# Patient Record
Sex: MALE | Race: WHITE | Employment: OTHER | URBAN - METROPOLITAN AREA
[De-identification: names, ages, dates, MRNs, and addresses within clinical notes are randomized per-mention and may not be internally consistent; named-entity substitution may affect disease eponyms.]

---

## 2024-02-12 LAB — NON-UH HIE PLATELET FUNCTION (ASPIRIN): 621 ARU

## 2024-02-15 LAB — Lab: 207 PRU

## 2025-02-26 ENCOUNTER — HOSPITAL ENCOUNTER (OUTPATIENT)
Dept: RADIOLOGY | Facility: CLINIC | Age: 65
Discharge: HOME | End: 2025-02-26
Payer: MEDICARE

## 2025-02-26 ENCOUNTER — OFFICE VISIT (OUTPATIENT)
Dept: ORTHOPEDIC SURGERY | Facility: CLINIC | Age: 65
End: 2025-02-26
Payer: MEDICARE

## 2025-02-26 DIAGNOSIS — M25.551 RIGHT HIP PAIN: ICD-10-CM

## 2025-02-26 DIAGNOSIS — S73.191A TEAR OF RIGHT ACETABULAR LABRUM, INITIAL ENCOUNTER: Primary | ICD-10-CM

## 2025-02-26 PROCEDURE — 1036F TOBACCO NON-USER: CPT | Performed by: STUDENT IN AN ORGANIZED HEALTH CARE EDUCATION/TRAINING PROGRAM

## 2025-02-26 PROCEDURE — 99213 OFFICE O/P EST LOW 20 MIN: CPT | Performed by: STUDENT IN AN ORGANIZED HEALTH CARE EDUCATION/TRAINING PROGRAM

## 2025-02-26 PROCEDURE — 99203 OFFICE O/P NEW LOW 30 MIN: CPT | Performed by: STUDENT IN AN ORGANIZED HEALTH CARE EDUCATION/TRAINING PROGRAM

## 2025-02-26 PROCEDURE — 1159F MED LIST DOCD IN RCRD: CPT | Performed by: STUDENT IN AN ORGANIZED HEALTH CARE EDUCATION/TRAINING PROGRAM

## 2025-02-26 PROCEDURE — 99214 OFFICE O/P EST MOD 30 MIN: CPT | Performed by: STUDENT IN AN ORGANIZED HEALTH CARE EDUCATION/TRAINING PROGRAM

## 2025-02-26 PROCEDURE — 73502 X-RAY EXAM HIP UNI 2-3 VIEWS: CPT | Mod: RT

## 2025-02-26 RX ORDER — TAMSULOSIN HYDROCHLORIDE 0.4 MG/1
1 CAPSULE ORAL
COMMUNITY
Start: 2025-02-03

## 2025-02-26 RX ORDER — TIZANIDINE HYDROCHLORIDE 2 MG/1
2 CAPSULE, GELATIN COATED ORAL 3 TIMES DAILY
COMMUNITY
Start: 2023-12-07

## 2025-02-26 RX ORDER — CETIRIZINE HYDROCHLORIDE 10 MG/1
1 TABLET ORAL DAILY
COMMUNITY

## 2025-02-26 RX ORDER — NITROGLYCERIN 0.4 MG/1
0.4 TABLET SUBLINGUAL EVERY 5 MIN PRN
COMMUNITY
Start: 2023-10-12

## 2025-02-26 RX ORDER — HYDROCHLOROTHIAZIDE 12.5 MG/1
12.5 CAPSULE ORAL EVERY MORNING
COMMUNITY

## 2025-02-26 RX ORDER — TRAMADOL HYDROCHLORIDE 50 MG/1
50 TABLET ORAL EVERY 8 HOURS
COMMUNITY
Start: 2024-03-06

## 2025-02-26 RX ORDER — GABAPENTIN 300 MG/1
1 CAPSULE ORAL 3 TIMES DAILY
COMMUNITY
Start: 2023-12-05

## 2025-02-26 RX ORDER — PANTOPRAZOLE SODIUM 40 MG/1
40 TABLET, DELAYED RELEASE ORAL
COMMUNITY
Start: 2022-02-10

## 2025-02-26 RX ORDER — LOSARTAN POTASSIUM 100 MG/1
100 TABLET ORAL DAILY
COMMUNITY

## 2025-02-26 RX ORDER — METOPROLOL SUCCINATE 50 MG/1
50 TABLET, EXTENDED RELEASE ORAL DAILY
COMMUNITY

## 2025-02-26 RX ORDER — SEMAGLUTIDE 2.68 MG/ML
INJECTION, SOLUTION SUBCUTANEOUS WEEKLY
COMMUNITY

## 2025-02-26 RX ORDER — OMEPRAZOLE 40 MG/1
40 CAPSULE, DELAYED RELEASE ORAL
COMMUNITY

## 2025-02-26 RX ORDER — AMLODIPINE BESYLATE 2.5 MG/1
1 TABLET ORAL DAILY
COMMUNITY
Start: 2024-11-12

## 2025-02-26 RX ORDER — ATORVASTATIN CALCIUM 40 MG/1
40 TABLET, FILM COATED ORAL DAILY
COMMUNITY
Start: 2022-02-10

## 2025-02-26 RX ORDER — LOSARTAN POTASSIUM AND HYDROCHLOROTHIAZIDE 25; 100 MG/1; MG/1
1 TABLET ORAL DAILY
COMMUNITY
Start: 2022-05-25

## 2025-02-26 RX ORDER — METFORMIN HYDROCHLORIDE 500 MG/1
500 TABLET, EXTENDED RELEASE ORAL
COMMUNITY
Start: 2022-04-07

## 2025-02-26 RX ORDER — ASPIRIN 81 MG/1
1 TABLET ORAL DAILY
COMMUNITY

## 2025-02-26 RX ORDER — CLOPIDOGREL BISULFATE 75 MG/1
75 TABLET ORAL DAILY
COMMUNITY

## 2025-02-26 NOTE — PROGRESS NOTES
History of Present Illness  Patient presents with right groin pain for 1 to  years.  The patient localizes the pain predominantly anterior.  Recently there has been concern for falls and instability.  There is increasing difficulty with activities of daily living and significant disability related to the hip pain.  The patient endorses the following failed non-operative treatments: Activity modifications.   There is increasing frustration with persistent pain and discomfor and decreasing distance of ambulation.  Pain is moderate, achy, diffuse.  Better with rest, worse with activity.      Patient endorses sporadic right hip pain pain primarily about the groin region.  Quite sporadic in nature.  Able to change the position of his leg then this does improve.     History reviewed. No pertinent past medical history.    Medication Documentation Review Audit       Reviewed by Shalini Carrasco MA (Medical Assistant) on 02/26/25 at 1028      Medication Order Taking? Sig Documenting Provider Last Dose Status   amLODIPine (Norvasc) 2.5 mg tablet [id number] Yes Take 1 tablet (2.5 mg) by mouth once daily. Historical Provider, MD  Active   aspirin (Munira Low Dose Aspirin) 81 mg EC tablet [id number]  Take 1 tablet (81 mg) by mouth once daily. Historical Provider, MD  Active   atorvastatin (Lipitor) 40 mg tablet [id number] Yes Take 1 tablet (40 mg) by mouth once daily. Historical Provider, MD  Active   cetirizine (ZyrTEC) 10 mg tablet [id number]  Take 1 tablet (10 mg) by mouth once daily. Historical Provider, MD  Active   clopidogrel (Plavix) 75 mg tablet [id number]  Take 1 tablet (75 mg) by mouth once daily. Historical Provider, MD  Active   gabapentin (Neurontin) 300 mg capsule [id number] Yes Take 1 capsule (300 mg) by mouth 3 times a day. Historical Provider, MD  Active   hydroCHLOROthiazide (Microzide) 12.5 mg capsule [id number]  Take 1 capsule (12.5 mg) by mouth once daily in the morning. Historical Provider, MD  Active    losartan (Cozaar) 100 mg tablet [id number]  Take 1 tablet (100 mg) by mouth once daily. Historical Provider, MD  Active   losartan-hydrochlorothiazide (Hyzaar) 100-25 mg tablet [id number] Yes Take 1 tablet by mouth once daily. Xiao Ferrara MD  Active   metFORMIN  mg 24 hr tablet [id number] Yes Take 1 tablet (500 mg) by mouth once daily in the evening. Take with meals. Xiao Ferrara MD  Active   metoprolol succinate XL (Toprol-XL) 50 mg 24 hr tablet [id number]  Take 1 tablet (50 mg) by mouth once daily. Xiao Ferrara MD  Active   nitroglycerin (Nitrostat) 0.4 mg SL tablet [id number] Yes Place 1 tablet (0.4 mg) under the tongue every 5 minutes if needed for chest pain. Xiao Ferrara MD  Active   omeprazole (PriLOSEC) 40 mg DR capsule [id number]  Take 1 capsule (40 mg) by mouth once daily in the morning. Take before meals. Xiao Ferrara MD  Active   Ozempic 2 mg/dose (8 mg/3 mL) pen injector [id number]  1 (one) time per week. Xiao Ferrara MD  Active   pantoprazole (ProtoNix) 40 mg EC tablet [id number] Yes Take 1 tablet (40 mg) by mouth once daily in the morning. Take before meals. Xiao Ferrara MD  Active   tamsulosin (Flomax) 0.4 mg 24 hr capsule [id number] Yes Take 1 capsule (0.4 mg) by mouth early in the morning.. Xiao Ferrara MD  Active   tiZANidine (Zanaflex) 2 mg capsule [id number] Yes Take 1 capsule (2 mg) by mouth 3 times a day. Xiao Ferrara MD  Active   traMADol (Ultram) 50 mg tablet [id number] Yes Take 1 tablet (50 mg) by mouth every 8 hours. Historical Provider, MD  Active                    No Known Allergies    Social History     Socioeconomic History    Marital status:      Spouse name: Not on file    Number of children: Not on file    Years of education: Not on file    Highest education level: Not on file   Occupational History    Not on file   Tobacco Use    Smoking status: Never    Smokeless tobacco: Never   Substance and Sexual  Activity    Alcohol use: Not on file    Drug use: Not on file    Sexual activity: Not on file   Other Topics Concern    Not on file   Social History Narrative    Not on file     Social Drivers of Health     Financial Resource Strain: Not on file   Food Insecurity: Not on file   Transportation Needs: Not on file   Physical Activity: Not on file   Stress: Not on file   Social Connections: Not on file   Intimate Partner Violence: Not on file   Housing Stability: Not on file       Past Surgical History:   Procedure Laterality Date    OTHER SURGICAL HISTORY  03/03/2022    Varicose vein ligation    OTHER SURGICAL HISTORY  03/03/2022    Arterial stent placement    OTHER SURGICAL HISTORY  03/03/2022    Back surgery    OTHER SURGICAL HISTORY  03/03/2022    Wrist arthroscopy          Review of Systems   GENERAL: Negative  CV: NEGATIVE  RESPIRATORY: Negative  GI: Negative  MUSCULOSKELETAL: See HPI  SKIN: Negative  NEURO:  Negative     Physical Exam:  General: No acute distress alert and orient x3  HEENT: Normocephalic atraumatic.  Pupils round and reactive.  Trachea midline  Cardiovascular: Regular rate and rhythm.  Respiratory: Bilateral chest rise.  Breathing unlabored.  Abdomen: Nontender nondistended no rebounding.  See formal musculoskeletal below:    Exam  Right hip:  Antalgic gait  Negative Trendelenburg sign  Skin healthy and intact  No tenderness to palpation over lumbar spine  Minimal tenderness over greater trochanter     Mild pain with extreme of forward flexion   Rotation: Mild discomfort  No weakness with resisted hip flexion, abduction or adduction     Positive flexion/adduction/internal rotation  Negative flexion/abduction/external rotation test  Negative straight leg raise  Neurovascular exam normal distally     Radiographs  XR hip right with pelvis when performed 2 or 3 views    Result Date: 2/26/2025  Interpreted By:  Ranjith Vargas III, STUDY: XR HIP RIGHT WITH PELVIS WHEN PERFORMED 2 OR 3 VIEWS; ;   2/26/2025 10:51 am   INDICATION: Signs/Symptoms:pain.   ,M25.551 Pain in right hip   COMPARISON: None.   ACCESSION NUMBER(S): [id number]   ORDERING CLINICIAN: NAUN HERNANDEZ   FINDINGS: AP pelvis, lateral right hip: No acute osseous abnormality no fracture or dislocation appreciated there is some sclerosis about the pubic symphysis. Mild joint space narrowing about bilateral hips mild-to-moderate size cam deformity. Enthesophytic change about bilateral iliac crests. No lytic or blastic lesions appreciated       No acute osseous abnormality     MACRO: None   Signed by: Naun Hernandez III 2/26/2025 10:55 AM Dictation workstation:   [id number]      Procedures     Assessment  Patient with mild to moderate right hip osteoarthritis     Plan  We discussed with the patient the diagnosis of mild to moderate right degenerative joint disease of the hip.  We reviewed an evidence-based approach to osteoarthritis of the hip.  We strongly encouraged low-impact aerobic activity and non-opioid analgesics.     We discussed temporary pain relief with corticosteroid injections and the associated risks.      The patient elected for referral for intra-articular cortisone injection  Discussed that this is both diagnostic and therapeutic  Discussed nature of injection about the hip.  He will follow-up in 2 months for assessment of progress may benefit from oral anti-inflammatories if this does not make him improve

## 2025-03-04 ENCOUNTER — OFFICE VISIT (OUTPATIENT)
Dept: URGENT CARE | Age: 65
End: 2025-03-04
Payer: MEDICARE

## 2025-03-04 VITALS
RESPIRATION RATE: 16 BRPM | WEIGHT: 220 LBS | DIASTOLIC BLOOD PRESSURE: 76 MMHG | SYSTOLIC BLOOD PRESSURE: 155 MMHG | HEART RATE: 78 BPM | OXYGEN SATURATION: 98 % | TEMPERATURE: 98 F

## 2025-03-04 DIAGNOSIS — S56.912A FOREARM STRAIN, LEFT, INITIAL ENCOUNTER: Primary | ICD-10-CM

## 2025-03-04 PROCEDURE — 1036F TOBACCO NON-USER: CPT | Performed by: FAMILY MEDICINE

## 2025-03-04 PROCEDURE — 99203 OFFICE O/P NEW LOW 30 MIN: CPT | Performed by: FAMILY MEDICINE

## 2025-03-04 PROCEDURE — 1159F MED LIST DOCD IN RCRD: CPT | Performed by: FAMILY MEDICINE

## 2025-03-04 RX ORDER — NAPROXEN 500 MG/1
500 TABLET ORAL 2 TIMES DAILY PRN
Qty: 20 TABLET | Refills: 0 | Status: SHIPPED | OUTPATIENT
Start: 2025-03-04 | End: 2025-03-14

## 2025-03-04 ASSESSMENT — ENCOUNTER SYMPTOMS
OCCASIONAL FEELINGS OF UNSTEADINESS: 0
LOSS OF SENSATION IN FEET: 0
DEPRESSION: 0

## 2025-03-04 ASSESSMENT — PATIENT HEALTH QUESTIONNAIRE - PHQ9
1. LITTLE INTEREST OR PLEASURE IN DOING THINGS: NOT AT ALL
SUM OF ALL RESPONSES TO PHQ9 QUESTIONS 1 AND 2: 0
2. FEELING DOWN, DEPRESSED OR HOPELESS: NOT AT ALL

## 2025-03-04 NOTE — PROGRESS NOTES
"Subjective   Patient ID: Luciano Samaniego is a 64 y.o. male. They present today with a chief complaint of Injury (1 day-left forearm, swelling limited rom 9/10 pain scale shooting and radiating).    History of Present Illness  HPI  Patient presents with injury to left forearm last night after trying to control his Indian Ramos.  Patient states that his Indian Ramos \"freaked out\" and hyperextended his left elbow.  Since then he has had pain in his left forearm made worse with any movement of his hand or wrist.  He denies any cuts or abrasions.  No numbness or loss of sensation.  Past Medical History  Allergies as of 03/04/2025    (No Known Allergies)       (Not in a hospital admission)       No past medical history on file.    Past Surgical History:   Procedure Laterality Date    OTHER SURGICAL HISTORY  03/03/2022    Varicose vein ligation    OTHER SURGICAL HISTORY  03/03/2022    Arterial stent placement    OTHER SURGICAL HISTORY  03/03/2022    Back surgery    OTHER SURGICAL HISTORY  03/03/2022    Wrist arthroscopy        reports that he has never smoked. He has never used smokeless tobacco.    Review of Systems  Review of Systems                               Objective    Vitals:    03/04/25 1209   BP: 155/76   Pulse: 78   Resp: 16   Temp: 36.7 °C (98 °F)   SpO2: 98%   Weight: 99.8 kg (220 lb)     No LMP for male patient.    Physical Exam  General: Vitals noted, no distress. Afebrile.   Vascular: Left upper extremity warm and dry with good peripheral pulses noted  Extremities: No peripheral edema.  Diffuse tenderness over anterior left forearm with no swelling or deformity noted.   strength 5/5, but movement of hand and wrist increases forearm pain.  No point tenderness noted.  No tenderness or swelling noted over left elbow.  Skin: No rash. No bruising or discoloration. No cuts or abrasions  Neuro: No focal neurologic deficits, NIH score of 0.    Procedures    Point of Care Test & Imaging Results from this " visit  No results found for this visit on 03/04/25.   No results found.    Diagnostic study results (if any) were reviewed by Osvaldo Arzate MD.    Assessment/Plan   Allergies, medications, history, and pertinent labs/EKGs/Imaging reviewed by Osvaldo Arzate MD.     Medical Decision Making  At time of discharge patient was clinically well-appearing and HDS for outpatient management. The patient and/or family was educated regarding diagnosis, supportive care, OTC and Rx medications. The patient and/or family was given the opportunity to ask questions prior to discharge.  They verbalized understanding of my discussion of the plans for treatment, expected course, indications to return to  or seek further evaluation in ED, and the need for timely follow up as directed.   They were provided with a work/school excuse if requested.    Orders and Diagnoses  Diagnoses and all orders for this visit:  Forearm strain, left, initial encounter  -     Arm Sling, Universal  -     naproxen (Naprosyn) 500 mg tablet; Take 1 tablet (500 mg) by mouth 2 times a day as needed for mild pain (1 - 3) or moderate pain (4 - 6) for up to 10 days.      Medical Admin Record      Patient disposition: Home    Electronically signed by Osvaldo Arzate MD  12:37 PM

## 2025-03-04 NOTE — PATIENT INSTRUCTIONS
Elevate and rest arm as able  Wear sling when upright for the next week  Apply ice to affected area several times a day  Take naproxen twice a day with food as needed  See PCP in 1 week if no improvement

## 2025-03-05 ENCOUNTER — OFFICE VISIT (OUTPATIENT)
Dept: ORTHOPEDIC SURGERY | Facility: CLINIC | Age: 65
End: 2025-03-05
Payer: MEDICARE

## 2025-03-05 ENCOUNTER — HOSPITAL ENCOUNTER (OUTPATIENT)
Dept: RADIOLOGY | Facility: CLINIC | Age: 65
Discharge: HOME | End: 2025-03-05
Payer: MEDICARE

## 2025-03-05 VITALS — WEIGHT: 220 LBS

## 2025-03-05 DIAGNOSIS — S46.212A RUPTURE OF DISTAL BICEPS TENDON, LEFT, INITIAL ENCOUNTER: ICD-10-CM

## 2025-03-05 DIAGNOSIS — M25.522 LEFT ELBOW PAIN: ICD-10-CM

## 2025-03-05 DIAGNOSIS — S46.212A RUPTURE OF DISTAL BICEPS TENDON, LEFT, INITIAL ENCOUNTER: Primary | ICD-10-CM

## 2025-03-05 PROCEDURE — 99213 OFFICE O/P EST LOW 20 MIN: CPT | Performed by: INTERNAL MEDICINE

## 2025-03-05 PROCEDURE — 73080 X-RAY EXAM OF ELBOW: CPT | Mod: LT

## 2025-03-05 PROCEDURE — L3670 SO ACRO/CLAV CAN WEB PRE OTS: HCPCS | Performed by: INTERNAL MEDICINE

## 2025-03-05 PROCEDURE — 99214 OFFICE O/P EST MOD 30 MIN: CPT | Performed by: INTERNAL MEDICINE

## 2025-03-05 NOTE — PROGRESS NOTES
Acute Injury New Patient Visit    CC: No chief complaint on file.      HPI: Luciano is a 64 y.o. male presents today for evaluation for acute left elbow injury sustained yesterday after he hyperextended his left elbow while attempting to restrain his dog. He notes pain and swelling, pain is aggravated by hand or wrist motions. He was evaluated at the urgent care. He is here for initial evaluation. He is right had dominant.         Review of Systems   GENERAL: Negative for malaise, significant weight loss, fever  MUSCULOSKELETAL: See HPI  NEURO:  Negative for numbness / tingling     Past Medical History  No past medical history on file.    Medication review  Medication Documentation Review Audit       Reviewed by Cherise Davison MA (Medical Assistant) on 03/04/25 at 1208      Medication Order Taking? Sig Documenting Provider Last Dose Status   amLODIPine (Norvasc) 2.5 mg tablet [id number]  Take 1 tablet (2.5 mg) by mouth once daily. Historical Provider, MD  Active   aspirin (Munira Low Dose Aspirin) 81 mg EC tablet [id number]  Take 1 tablet (81 mg) by mouth once daily. Historical Provider, MD  Active   atorvastatin (Lipitor) 40 mg tablet [id number]  Take 1 tablet (40 mg) by mouth once daily. Historical Provider, MD  Active   cetirizine (ZyrTEC) 10 mg tablet [id number]  Take 1 tablet (10 mg) by mouth once daily. Historical Provider, MD  Active   clopidogrel (Plavix) 75 mg tablet [id number]  Take 1 tablet (75 mg) by mouth once daily. Historical Provider, MD  Active   gabapentin (Neurontin) 300 mg capsule [id number]  Take 1 capsule (300 mg) by mouth 3 times a day. Historical Provider, MD  Active   hydroCHLOROthiazide (Microzide) 12.5 mg capsule [id number]  Take 1 capsule (12.5 mg) by mouth once daily in the morning. Historical Provider, MD  Active   losartan (Cozaar) 100 mg tablet [id number]  Take 1 tablet (100 mg) by mouth once daily. Historical Provider, MD  Active   losartan-hydrochlorothiazide (Hyzaar) 100-25 mg tablet  [id number]  Take 1 tablet by mouth once daily. Xiao Ferrara MD  Active   metFORMIN  mg 24 hr tablet [id number]  Take 1 tablet (500 mg) by mouth once daily in the evening. Take with meals. Xiao Ferrara MD  Active   metoprolol succinate XL (Toprol-XL) 50 mg 24 hr tablet [id number]  Take 1 tablet (50 mg) by mouth once daily. Xiao Ferrara MD  Active   nitroglycerin (Nitrostat) 0.4 mg SL tablet [id number]  Place 1 tablet (0.4 mg) under the tongue every 5 minutes if needed for chest pain. Xiao Ferrara MD  Active   omeprazole (PriLOSEC) 40 mg DR capsule [id number]  Take 1 capsule (40 mg) by mouth once daily in the morning. Take before meals. Xiao Ferrara MD  Active   Ozempic 2 mg/dose (8 mg/3 mL) pen injector [id number]  1 (one) time per week. Xiao Ferrara MD  Active   pantoprazole (ProtoNix) 40 mg EC tablet [id number]  Take 1 tablet (40 mg) by mouth once daily in the morning. Take before meals. Xiao Ferrara MD  Active   tamsulosin (Flomax) 0.4 mg 24 hr capsule [id number]  Take 1 capsule (0.4 mg) by mouth early in the morning.. Xiao Ferrara MD  Active   tiZANidine (Zanaflex) 2 mg capsule [id number]  Take 1 capsule (2 mg) by mouth 3 times a day. Xiao Ferrara MD  Active   traMADol (Ultram) 50 mg tablet [id number]  Take 1 tablet (50 mg) by mouth every 8 hours. Xiao Ferrara MD  Active                    Allergies  No Known Allergies    Social History  Social History     Socioeconomic History    Marital status:      Spouse name: Not on file    Number of children: Not on file    Years of education: Not on file    Highest education level: Not on file   Occupational History    Not on file   Tobacco Use    Smoking status: Never    Smokeless tobacco: Never   Substance and Sexual Activity    Alcohol use: Not on file    Drug use: Not on file    Sexual activity: Not on file   Other Topics Concern    Not on file   Social History Narrative    Not on file      Social Drivers of Health     Financial Resource Strain: Not on file   Food Insecurity: Not on file   Transportation Needs: Not on file   Physical Activity: Not on file   Stress: Not on file   Social Connections: Not on file   Intimate Partner Violence: Not on file   Housing Stability: Not on file       Surgical History  Past Surgical History:   Procedure Laterality Date    OTHER SURGICAL HISTORY  03/03/2022    Varicose vein ligation    OTHER SURGICAL HISTORY  03/03/2022    Arterial stent placement    OTHER SURGICAL HISTORY  03/03/2022    Back surgery    OTHER SURGICAL HISTORY  03/03/2022    Wrist arthroscopy       Physical Exam:  GENERAL:  Patient is awake, alert, and oriented to person place and time.  Patient appears well nourished and well kept.  Affect Calm, Not Acutely Distressed.  HEENT:  Normocephalic, Atraumatic, EOMI  CARDIOVASCULAR:  Hemodynamically stable.  RESPIRATORY:  Normal respirations with unlabored breathing.  Extremity: Left elbow shows skin is intact.  Swelling the left elbow.  He can flex left elbow at 130 degrees with pain.  Lacks full extension by 2 degrees due to pain.  No pain over the lateral or medial epicondyle.  Unable to hook the distal biceps tendon.  Significant weakness with resisted supination.  Weakness with flexion left elbow.  Slight deformity of the muscle bicep belly, concerning for distal biceps tendon rupture.  Distal triceps tendon intact.  Distal pulses are palpable.  Satisfactory capillary refill time.  Right elbow was examined for comparison.      Diagnostics: X-rays reviewed  XR hip right with pelvis when performed 2 or 3 views  Addendum: Interpreted By:  Ranjith Vargas III,    ADDENDUM:   Mild-to-moderate joint space narrowing        Signed by: Ranjith Vargas III 2/26/2025 11:02 AM        -------- ORIGINAL REPORT --------   Dictation workstation:   [id number]  Narrative: Interpreted By:  Ranjith Vargas III,   STUDY:  XR HIP RIGHT WITH PELVIS WHEN PERFORMED 2 OR 3  VIEWS; ;  2/26/2025  10:51 am      INDICATION:  Signs/Symptoms:pain.      ,M25.551 Pain in right hip      COMPARISON:  None.      ACCESSION NUMBER(S):  [id number]      ORDERING CLINICIAN:  NAUN HERNANDEZ      FINDINGS:  AP pelvis, lateral right hip: No acute osseous abnormality no  fracture or dislocation appreciated there is some sclerosis about the  pubic symphysis. Mild joint space narrowing about bilateral hips  mild-to-moderate size cam deformity. Enthesophytic change about  bilateral iliac crests. No lytic or blastic lesions appreciated      Impression: No acute osseous abnormality          MACRO:  None      Signed by: Naun Hernandez III 2/26/2025 10:55 AM  Dictation workstation:   [id number]      Procedure: None    Assessment: Left distal biceps tendon rupture    Plan: Luciano presents today for evaluation for acute left elbow injury sustained yesterday after he hyperextended his left elbow while attempting to restrain his dog. We recommended stat MRI of the left elbow for preoperative planning for distal biceps tendon rupture and sling for comfort. He will follow-up with Dr Naun Hernandez after the MRI.    No orders of the defined types were placed in this encounter.     At the conclusion of the visit there were no further questions by the patient/family regarding their plan of care.  Patient was instructed to call or return with any issues, questions, or concerns regarding their injury and/or treatment plan described above.     03/05/25 at 8:33 AM - Keegan Khan MD  Scribe Attestation  By signing my name below, I, Edgard Baxter, Scribe   attest that this documentation has been prepared under the direction and in the presence of Keegan Khan MD.    Office: [phone]    This note was prepared using voice recognition software.  The details of this note are correct and have been reviewed, and corrected to the best of my ability.  Some grammatical errors may persist related to the Dragon software.

## 2025-03-06 ENCOUNTER — HOSPITAL ENCOUNTER (OUTPATIENT)
Dept: RADIOLOGY | Facility: HOSPITAL | Age: 65
Discharge: HOME | End: 2025-03-06
Payer: MEDICARE

## 2025-03-06 DIAGNOSIS — S46.212A RUPTURE OF DISTAL BICEPS TENDON, LEFT, INITIAL ENCOUNTER: ICD-10-CM

## 2025-03-06 PROCEDURE — 73221 MRI JOINT UPR EXTREM W/O DYE: CPT | Mod: LT

## 2025-03-07 ENCOUNTER — OFFICE VISIT (OUTPATIENT)
Dept: ORTHOPEDIC SURGERY | Facility: CLINIC | Age: 65
End: 2025-03-07
Payer: MEDICARE

## 2025-03-07 DIAGNOSIS — S46.119A TRAUMATIC PARTIAL TEAR OF BICEPS TENDON, INITIAL ENCOUNTER: Primary | ICD-10-CM

## 2025-03-07 NOTE — PROGRESS NOTES
History of Present Illness:  The patient presents today complaining of left elbow pain.  The patient felt a ``pop´´ and noted pain and swelling in the antecubital fossa.  The patient denies any significant antecedent pain.  The patient denies any numbness or tingling.    Patient sustained a hyperextension type injury to his left elbow while attempting to restrain his dog.  This occurred 3/4/2025.  Did get an MRI.  Presents today for discussion of these results    No past medical history on file.  Past Surgical History:   Procedure Laterality Date    OTHER SURGICAL HISTORY  03/03/2022    Varicose vein ligation    OTHER SURGICAL HISTORY  03/03/2022    Arterial stent placement    OTHER SURGICAL HISTORY  03/03/2022    Back surgery    OTHER SURGICAL HISTORY  03/03/2022    Wrist arthroscopy       Current Outpatient Medications:     amLODIPine (Norvasc) 2.5 mg tablet, Take 1 tablet (2.5 mg) by mouth once daily., Disp: , Rfl:     aspirin (Munira Low Dose Aspirin) 81 mg EC tablet, Take 1 tablet (81 mg) by mouth once daily., Disp: , Rfl:     atorvastatin (Lipitor) 40 mg tablet, Take 1 tablet (40 mg) by mouth once daily., Disp: , Rfl:     cetirizine (ZyrTEC) 10 mg tablet, Take 1 tablet (10 mg) by mouth once daily., Disp: , Rfl:     clopidogrel (Plavix) 75 mg tablet, Take 1 tablet (75 mg) by mouth once daily., Disp: , Rfl:     gabapentin (Neurontin) 300 mg capsule, Take 1 capsule (300 mg) by mouth 3 times a day., Disp: , Rfl:     hydroCHLOROthiazide (Microzide) 12.5 mg capsule, Take 1 capsule (12.5 mg) by mouth once daily in the morning., Disp: , Rfl:     losartan (Cozaar) 100 mg tablet, Take 1 tablet (100 mg) by mouth once daily., Disp: , Rfl:     losartan-hydrochlorothiazide (Hyzaar) 100-25 mg tablet, Take 1 tablet by mouth once daily., Disp: , Rfl:     metFORMIN  mg 24 hr tablet, Take 1 tablet (500 mg) by mouth once daily in the evening. Take with meals., Disp: , Rfl:     metoprolol succinate XL (Toprol-XL) 50 mg 24  hr tablet, Take 1 tablet (50 mg) by mouth once daily., Disp: , Rfl:     naproxen (Naprosyn) 500 mg tablet, Take 1 tablet (500 mg) by mouth 2 times a day as needed for mild pain (1 - 3) or moderate pain (4 - 6) for up to 10 days., Disp: 20 tablet, Rfl: 0    nitroglycerin (Nitrostat) 0.4 mg SL tablet, Place 1 tablet (0.4 mg) under the tongue every 5 minutes if needed for chest pain., Disp: , Rfl:     omeprazole (PriLOSEC) 40 mg DR capsule, Take 1 capsule (40 mg) by mouth once daily in the morning. Take before meals., Disp: , Rfl:     Ozempic 2 mg/dose (8 mg/3 mL) pen injector, 1 (one) time per week., Disp: , Rfl:     pantoprazole (ProtoNix) 40 mg EC tablet, Take 1 tablet (40 mg) by mouth once daily in the morning. Take before meals., Disp: , Rfl:     tamsulosin (Flomax) 0.4 mg 24 hr capsule, Take 1 capsule (0.4 mg) by mouth early in the morning.., Disp: , Rfl:     tiZANidine (Zanaflex) 2 mg capsule, Take 1 capsule (2 mg) by mouth 3 times a day., Disp: , Rfl:     traMADol (Ultram) 50 mg tablet, Take 1 tablet (50 mg) by mouth every 8 hours., Disp: , Rfl:     Review of Systems   GENERAL: Negative for malaise, significant weight loss, fever  MUSCULOSKELETAL: see HPI  NEURO:  Negative    Physical Examination:  Left Elbow:  Skin healthy and intact  Swelling and ecchymosis noted   Tolerates ROM with no gross defecits  Pain with resisted flexion / supination   Negative hook test  Normal neurovascular exam distally     Imaging:  MR elbow left wo IV contrast    Result Date: 3/6/2025  Interpreted By:  Deborah Dumont and Abuhamdeh Imran STUDY: MRI of the left elbow with out IV contrast;  3/6/2025 10:10 am   INDICATION: Signs/Symptoms:Pain   COMPARISON: Left elbow radiograph 03/05/2025   ACCESSION NUMBER(S): [id number]   ORDERING CLINICIAN: CAROLYN MORALES   TECHNIQUE: Multiplanar multisequence MRI of the left elbow was performed without intravenous contrast.   FINDINGS: Tendons:  The common flexor tendon origin is intact.  There is focal hyperintense T2 signal at the origin of the common extensor tendon superimposed on moderate thickening with intermediate T2 signal. No significant surrounding soft tissue edema. The triceps tendon insertion is intact. There is focal discontinuity of the radial tuberosity attachment of the short head of the biceps with intact long head attachment, best appreciated on sagittal image 16. The biceps tendon insertion on the radial tuberosity remains intact. The brachialis insertion is intact.   Ligaments:  The ulnar collateral ligament is intact. The lateral ulnar collateral ligament is intact. The radial collateral ligament is intact.   Joints:  There is no dislocation. The articular cartilage is intact. There is no osteochondral defect.   Joint effusion/Bursa:  There is a small elbow joint effusion. There is no olecranon bursitis.There is also a small amount of fluid within the bicipitoradial bursa.   Muscles:  Musculature is normal without tear or atrophy.   Nerves:  The ulnar nerve is normal.   Bone Marrow:  The bone marrow signal is normal. There is no fracture or contusion. There is no marrow replacing lesions.       1. Partial-thickness tear of the short head of the biceps near radial tuberosity attachment with intact long head. No significant retraction of the torn fibers. 2. Mild reactive bicipitoradial bursitis. 3. Chronic appearing low-grade partial-thickness tear of the origin of the common extensor tendon superimposed on moderate tendinosis.   I personally reviewed the images/study and I agree with Dr. Magy Valencia and the findings as stated.   MACRO: None.   Signed by: Deborah Dumont 3/6/2025 11:29 AM Dictation workstation:   [id number]    Assessment:  Patient with left elbow pain, partial distal biceps tendon rupture, chronic low-grade, extensor tendinitis    Plan:  We reviewed the disease process with the patient.   MRI reviewed my independent interpretation as follows:  Partial-thickness tear of short head of the biceps.  No retraction.  Long head of biceps is still intact.  MRI findings discussed with Luciano today wrist benefits alternative treatment discussed with Luciano as well given that this is a partial-thickness tear.  Operative and nonoperative treatment modalities discussed.  Recommended proceed with conservative treatment at this point in time discussed activities to avoid as well as importance using pain as a guide  He is going to follow-up in 6 weeks for evaluation of progress.  Recommend nonweightbearing to the left upper extremity  Okay to gently wean out of the sling over the next 2 weeks time  Operative nonoperative treatment modalities discussed and considered.  Discussed that he will likely have residual limitations to some extent however we will see how much healing does not happen with conservative management.  Will continue to monitor signs and symptoms.  Prior note reviewed by Keegan Khan MD  This is an acute problem with systemic findings

## 2025-03-10 ENCOUNTER — HOSPITAL ENCOUNTER (OUTPATIENT)
Dept: RADIOLOGY | Facility: HOSPITAL | Age: 65
Discharge: HOME | End: 2025-03-10
Payer: MEDICARE

## 2025-03-10 DIAGNOSIS — S73.191A TEAR OF RIGHT ACETABULAR LABRUM, INITIAL ENCOUNTER: ICD-10-CM

## 2025-03-10 PROCEDURE — 2500000004 HC RX 250 GENERAL PHARMACY W/ HCPCS (ALT 636 FOR OP/ED): Performed by: STUDENT IN AN ORGANIZED HEALTH CARE EDUCATION/TRAINING PROGRAM

## 2025-03-10 PROCEDURE — 77002 NEEDLE LOCALIZATION BY XRAY: CPT | Mod: RT

## 2025-03-10 PROCEDURE — 2550000001 HC RX 255 CONTRASTS: Performed by: STUDENT IN AN ORGANIZED HEALTH CARE EDUCATION/TRAINING PROGRAM

## 2025-03-10 RX ORDER — TRIAMCINOLONE ACETONIDE 40 MG/ML
40 INJECTION, SUSPENSION INTRA-ARTICULAR; INTRAMUSCULAR ONCE
Status: COMPLETED | OUTPATIENT
Start: 2025-03-10 | End: 2025-03-10

## 2025-03-10 RX ORDER — BUPIVACAINE HYDROCHLORIDE 5 MG/ML
3 INJECTION, SOLUTION EPIDURAL; INTRACAUDAL ONCE
Status: COMPLETED | OUTPATIENT
Start: 2025-03-10 | End: 2025-03-10

## 2025-03-10 RX ORDER — LIDOCAINE HYDROCHLORIDE 10 MG/ML
8 INJECTION, SOLUTION EPIDURAL; INFILTRATION; INTRACAUDAL; PERINEURAL ONCE
Status: COMPLETED | OUTPATIENT
Start: 2025-03-10 | End: 2025-03-10

## 2025-03-10 RX ADMIN — TRIAMCINOLONE ACETONIDE 40 MG: 40 INJECTION, SUSPENSION INTRA-ARTICULAR; INTRAMUSCULAR at 14:10

## 2025-03-10 RX ADMIN — IOHEXOL 10 ML: 350 INJECTION, SOLUTION INTRAVENOUS at 14:08

## 2025-03-10 RX ADMIN — BUPIVACAINE HYDROCHLORIDE 3 ML: 5 INJECTION, SOLUTION EPIDURAL; INTRACAUDAL; PERINEURAL at 14:09

## 2025-03-10 RX ADMIN — LIDOCAINE HYDROCHLORIDE 8 ML: 10 INJECTION, SOLUTION EPIDURAL; INFILTRATION; INTRACAUDAL; PERINEURAL at 14:09

## 2025-04-14 ENCOUNTER — OFFICE VISIT (OUTPATIENT)
Dept: ORTHOPEDIC SURGERY | Facility: CLINIC | Age: 65
End: 2025-04-14
Payer: MEDICARE

## 2025-04-14 ENCOUNTER — APPOINTMENT (OUTPATIENT)
Dept: ORTHOPEDIC SURGERY | Facility: CLINIC | Age: 65
End: 2025-04-14
Payer: MEDICARE

## 2025-04-14 DIAGNOSIS — S46.119A TRAUMATIC PARTIAL TEAR OF BICEPS TENDON, INITIAL ENCOUNTER: Primary | ICD-10-CM

## 2025-04-14 PROCEDURE — 99213 OFFICE O/P EST LOW 20 MIN: CPT | Performed by: STUDENT IN AN ORGANIZED HEALTH CARE EDUCATION/TRAINING PROGRAM

## 2025-04-14 NOTE — PROGRESS NOTES
History of Present Illness:  The patient presents today complaining of left elbow pain    Patient sustained a hyperextension type injury to his left elbow while attempting to restrain his dog.  This occurred 3/4/2025.  Did get an MRI this was consistent with partial biceps tendon tear.  I did recommend we will proceed with conservative treatment last visit given these findings.  Unfortunately patient sustained a fall about 3 weeks ago continues to have elbow pain and even worsening.  Pain particular with supination type activities.  Of note patient recently did get cardiac catheterization subsequent stent placement.    No past medical history on file.  Past Surgical History:   Procedure Laterality Date    OTHER SURGICAL HISTORY  03/03/2022    Varicose vein ligation    OTHER SURGICAL HISTORY  03/03/2022    Arterial stent placement    OTHER SURGICAL HISTORY  03/03/2022    Back surgery    OTHER SURGICAL HISTORY  03/03/2022    Wrist arthroscopy       Current Outpatient Medications:     amLODIPine (Norvasc) 2.5 mg tablet, Take 1 tablet (2.5 mg) by mouth once daily., Disp: , Rfl:     aspirin (Munira Low Dose Aspirin) 81 mg EC tablet, Take 1 tablet (81 mg) by mouth once daily., Disp: , Rfl:     atorvastatin (Lipitor) 40 mg tablet, Take 1 tablet (40 mg) by mouth once daily., Disp: , Rfl:     cetirizine (ZyrTEC) 10 mg tablet, Take 1 tablet (10 mg) by mouth once daily., Disp: , Rfl:     clopidogrel (Plavix) 75 mg tablet, Take 1 tablet (75 mg) by mouth once daily., Disp: , Rfl:     gabapentin (Neurontin) 300 mg capsule, Take 1 capsule (300 mg) by mouth 3 times a day., Disp: , Rfl:     hydroCHLOROthiazide (Microzide) 12.5 mg capsule, Take 1 capsule (12.5 mg) by mouth once daily in the morning., Disp: , Rfl:     losartan (Cozaar) 100 mg tablet, Take 1 tablet (100 mg) by mouth once daily., Disp: , Rfl:     losartan-hydrochlorothiazide (Hyzaar) 100-25 mg tablet, Take 1 tablet by mouth once daily., Disp: , Rfl:     metFORMIN XR  500 mg 24 hr tablet, Take 1 tablet (500 mg) by mouth once daily in the evening. Take with meals., Disp: , Rfl:     metoprolol succinate XL (Toprol-XL) 50 mg 24 hr tablet, Take 1 tablet (50 mg) by mouth once daily., Disp: , Rfl:     nitroglycerin (Nitrostat) 0.4 mg SL tablet, Place 1 tablet (0.4 mg) under the tongue every 5 minutes if needed for chest pain., Disp: , Rfl:     omeprazole (PriLOSEC) 40 mg DR capsule, Take 1 capsule (40 mg) by mouth once daily in the morning. Take before meals., Disp: , Rfl:     Ozempic 2 mg/dose (8 mg/3 mL) pen injector, 1 (one) time per week., Disp: , Rfl:     pantoprazole (ProtoNix) 40 mg EC tablet, Take 1 tablet (40 mg) by mouth once daily in the morning. Take before meals., Disp: , Rfl:     tamsulosin (Flomax) 0.4 mg 24 hr capsule, Take 1 capsule (0.4 mg) by mouth early in the morning.., Disp: , Rfl:     tiZANidine (Zanaflex) 2 mg capsule, Take 1 capsule (2 mg) by mouth 3 times a day., Disp: , Rfl:     traMADol (Ultram) 50 mg tablet, Take 1 tablet (50 mg) by mouth every 8 hours., Disp: , Rfl:     Review of Systems   GENERAL: Negative for malaise, significant weight loss, fever  MUSCULOSKELETAL: see HPI  NEURO:  Negative    Physical Examination:  Left Elbow:  Skin healthy and intact  Swelling and ecchymosis noted   Tolerates ROM with no gross defecits  Pain with resisted flexion / supination   + hook test  Normal neurovascular exam distally     Imaging:  MR elbow left wo IV contrast    Result Date: 3/6/2025  Interpreted By:  Deborah Dumont and Abuhamdeh Imran STUDY: MRI of the left elbow with out IV contrast;  3/6/2025 10:10 am   INDICATION: Signs/Symptoms:Pain   COMPARISON: Left elbow radiograph 03/05/2025   ACCESSION NUMBER(S): [id number]   ORDERING CLINICIAN: CAROLYN MORALES   TECHNIQUE: Multiplanar multisequence MRI of the left elbow was performed without intravenous contrast.   FINDINGS: Tendons:  The common flexor tendon origin is intact. There is focal hyperintense T2  signal at the origin of the common extensor tendon superimposed on moderate thickening with intermediate T2 signal. No significant surrounding soft tissue edema. The triceps tendon insertion is intact. There is focal discontinuity of the radial tuberosity attachment of the short head of the biceps with intact long head attachment, best appreciated on sagittal image 16. The biceps tendon insertion on the radial tuberosity remains intact. The brachialis insertion is intact.   Ligaments:  The ulnar collateral ligament is intact. The lateral ulnar collateral ligament is intact. The radial collateral ligament is intact.   Joints:  There is no dislocation. The articular cartilage is intact. There is no osteochondral defect.   Joint effusion/Bursa:  There is a small elbow joint effusion. There is no olecranon bursitis.There is also a small amount of fluid within the bicipitoradial bursa.   Muscles:  Musculature is normal without tear or atrophy.   Nerves:  The ulnar nerve is normal.   Bone Marrow:  The bone marrow signal is normal. There is no fracture or contusion. There is no marrow replacing lesions.       1. Partial-thickness tear of the short head of the biceps near radial tuberosity attachment with intact long head. No significant retraction of the torn fibers. 2. Mild reactive bicipitoradial bursitis. 3. Chronic appearing low-grade partial-thickness tear of the origin of the common extensor tendon superimposed on moderate tendinosis.   I personally reviewed the images/study and I agree with Dr. Magy Valencia and the findings as stated.   MACRO: None.   Signed by: Deborah Dumont 3/6/2025 11:29 AM Dictation workstation:   [id number]    Assessment:  Patient with left elbow pain known history of partial biceps tendon rupture, concerns for new injury full-thickness biceps tendon tear    Plan:  We reviewed the disease process with the patient.   Given the recurrent trauma to the elbow and exam findings consistent  with concerns for possible full-thickness distal biceps tendon rupture I do recommend a proceed with a stat MRI for evaluation of the biceps tendon insertion  The history and clinical exam are consistent with intraarticular pathology and therefore MRI is medically indicated to evaluate the soft tissues of the joint.   Discussed activities to avoid as well as importance of using pain as a guide

## 2025-04-16 ENCOUNTER — TELEPHONE (OUTPATIENT)
Dept: ORTHOPEDIC SURGERY | Facility: CLINIC | Age: 65
End: 2025-04-16

## 2025-04-16 ENCOUNTER — HOSPITAL ENCOUNTER (OUTPATIENT)
Dept: RADIOLOGY | Facility: HOSPITAL | Age: 65
Discharge: HOME | End: 2025-04-16
Payer: MEDICARE

## 2025-04-16 ENCOUNTER — OFFICE VISIT (OUTPATIENT)
Dept: ORTHOPEDIC SURGERY | Facility: CLINIC | Age: 65
End: 2025-04-16
Payer: MEDICARE

## 2025-04-16 DIAGNOSIS — S46.119A TRAUMATIC PARTIAL TEAR OF BICEPS TENDON, INITIAL ENCOUNTER: ICD-10-CM

## 2025-04-16 DIAGNOSIS — S46.219A BICEPS TENDON TEAR: Primary | ICD-10-CM

## 2025-04-16 PROCEDURE — 73221 MRI JOINT UPR EXTREM W/O DYE: CPT | Mod: LT

## 2025-04-16 PROCEDURE — 99214 OFFICE O/P EST MOD 30 MIN: CPT | Performed by: STUDENT IN AN ORGANIZED HEALTH CARE EDUCATION/TRAINING PROGRAM

## 2025-04-16 PROCEDURE — 99214 OFFICE O/P EST MOD 30 MIN: CPT | Mod: 57 | Performed by: STUDENT IN AN ORGANIZED HEALTH CARE EDUCATION/TRAINING PROGRAM

## 2025-04-16 NOTE — PROGRESS NOTES
History of Present Illness:  The patient presents today complaining of left elbow pain    Patient sustained a hyperextension type injury to his left elbow while attempting to restrain his dog.  This occurred 3/4/2025.  Did get an MRI this was consistent with partial biceps tendon tear.  I did recommend we will proceed with conservative treatment last visit given these findings.  Unfortunately patient sustained a fall about 3 weeks ago continues to have elbow pain and even worsening.  Pain particular with supination type activities.  Of note patient recently did get cardiac catheterization subsequent stent placement.    Presents today for repeat evaluation of follow-up and discussion of MRI results continues have significant elbow pain preventing him from enjoying activities and enjoys particular with supination type activities.      No past medical history on file.  Past Surgical History:   Procedure Laterality Date    OTHER SURGICAL HISTORY  03/03/2022    Varicose vein ligation    OTHER SURGICAL HISTORY  03/03/2022    Arterial stent placement    OTHER SURGICAL HISTORY  03/03/2022    Back surgery    OTHER SURGICAL HISTORY  03/03/2022    Wrist arthroscopy       Current Outpatient Medications:     amLODIPine (Norvasc) 2.5 mg tablet, Take 1 tablet (2.5 mg) by mouth once daily., Disp: , Rfl:     aspirin (Munira Low Dose Aspirin) 81 mg EC tablet, Take 1 tablet (81 mg) by mouth once daily., Disp: , Rfl:     atorvastatin (Lipitor) 40 mg tablet, Take 1 tablet (40 mg) by mouth once daily., Disp: , Rfl:     cetirizine (ZyrTEC) 10 mg tablet, Take 1 tablet (10 mg) by mouth once daily., Disp: , Rfl:     clopidogrel (Plavix) 75 mg tablet, Take 1 tablet (75 mg) by mouth once daily., Disp: , Rfl:     gabapentin (Neurontin) 300 mg capsule, Take 1 capsule (300 mg) by mouth 3 times a day., Disp: , Rfl:     hydroCHLOROthiazide (Microzide) 12.5 mg capsule, Take 1 capsule (12.5 mg) by mouth once daily in the morning., Disp: , Rfl:      losartan (Cozaar) 100 mg tablet, Take 1 tablet (100 mg) by mouth once daily., Disp: , Rfl:     losartan-hydrochlorothiazide (Hyzaar) 100-25 mg tablet, Take 1 tablet by mouth once daily., Disp: , Rfl:     metFORMIN  mg 24 hr tablet, Take 1 tablet (500 mg) by mouth once daily in the evening. Take with meals., Disp: , Rfl:     metoprolol succinate XL (Toprol-XL) 50 mg 24 hr tablet, Take 1 tablet (50 mg) by mouth once daily., Disp: , Rfl:     nitroglycerin (Nitrostat) 0.4 mg SL tablet, Place 1 tablet (0.4 mg) under the tongue every 5 minutes if needed for chest pain., Disp: , Rfl:     omeprazole (PriLOSEC) 40 mg DR capsule, Take 1 capsule (40 mg) by mouth once daily in the morning. Take before meals., Disp: , Rfl:     Ozempic 2 mg/dose (8 mg/3 mL) pen injector, 1 (one) time per week., Disp: , Rfl:     pantoprazole (ProtoNix) 40 mg EC tablet, Take 1 tablet (40 mg) by mouth once daily in the morning. Take before meals., Disp: , Rfl:     tamsulosin (Flomax) 0.4 mg 24 hr capsule, Take 1 capsule (0.4 mg) by mouth early in the morning.., Disp: , Rfl:     tiZANidine (Zanaflex) 2 mg capsule, Take 1 capsule (2 mg) by mouth 3 times a day., Disp: , Rfl:     traMADol (Ultram) 50 mg tablet, Take 1 tablet (50 mg) by mouth every 8 hours., Disp: , Rfl:     Review of Systems   GENERAL: Negative for malaise, significant weight loss, fever  MUSCULOSKELETAL: see HPI  NEURO:  Negative    Physical Examination:  Left Elbow:  Skin healthy and intact  Swelling and ecchymosis noted   Tolerates ROM with no gross defecits  Pain with resisted flexion / supination   + hook test  Normal neurovascular exam distally     Imaging:  MR elbow left wo IV contrast    Result Date: 3/6/2025  Interpreted By:  Deborah Dumont  and Erik Bhatti STUDY: MRI of the left elbow with out IV contrast;  3/6/2025 10:10 am   INDICATION: Signs/Symptoms:Pain   COMPARISON: Left elbow radiograph 03/05/2025   ACCESSION NUMBER(S): [id number]   ORDERING CLINICIAN:  CAROLYN MORALES   TECHNIQUE: Multiplanar multisequence MRI of the left elbow was performed without intravenous contrast.   FINDINGS: Tendons:  The common flexor tendon origin is intact. There is focal hyperintense T2 signal at the origin of the common extensor tendon superimposed on moderate thickening with intermediate T2 signal. No significant surrounding soft tissue edema. The triceps tendon insertion is intact. There is focal discontinuity of the radial tuberosity attachment of the short head of the biceps with intact long head attachment, best appreciated on sagittal image 16. The biceps tendon insertion on the radial tuberosity remains intact. The brachialis insertion is intact.   Ligaments:  The ulnar collateral ligament is intact. The lateral ulnar collateral ligament is intact. The radial collateral ligament is intact.   Joints:  There is no dislocation. The articular cartilage is intact. There is no osteochondral defect.   Joint effusion/Bursa:  There is a small elbow joint effusion. There is no olecranon bursitis.There is also a small amount of fluid within the bicipitoradial bursa.   Muscles:  Musculature is normal without tear or atrophy.   Nerves:  The ulnar nerve is normal.   Bone Marrow:  The bone marrow signal is normal. There is no fracture or contusion. There is no marrow replacing lesions.       1. Partial-thickness tear of the short head of the biceps near radial tuberosity attachment with intact long head. No significant retraction of the torn fibers. 2. Mild reactive bicipitoradial bursitis. 3. Chronic appearing low-grade partial-thickness tear of the origin of the common extensor tendon superimposed on moderate tendinosis.   I personally reviewed the images/study and I agree with Dr. Magy Valencia and the findings as stated.   MACRO: None.   Signed by: Deborah Dumont 3/6/2025 11:29 AM Dictation workstation:   [id number]    Assessment:  Patient with left elbow high-grade distal biceps  tendon tear    Plan:  We reviewed the disease process with the patient.   I did review MRI today with Luciano.  I do not appreciate worsening of partial biceps tendon tear.  He does have significant weakness on examination with resisted flexion as well as resisted supination.  Feels like he has not really made any improvement over the last 6 weeks time is concerned that he is not can be able to garden and do activities that he enjoys.  Given this he does wish to proceed with further treatment to include distal biceps tendon repair discussed risk of the surgery to include but not limited to neurovascular compromise namely PIN nerve, infection.  Using shared informed tissue making he does wish to proceed    Based on history and physical exam as well as imaging findings recommend surgical intervention to include open distal biceps tendon repair  .  Risk benefits and alternatives to treatment were discussed.  Particular risks of the surgery include infection, malunion, periprosthetic fracture, neurovascular compromise, stiffness.  Despite these risks, patient wishes to proceed.  Operative benefits include restoration of anatomy postoperative restrictions and limitations were reviewed in detail.  Patient will schedule surgery at their earliest convenience.      The risks of surgery were discussed including but not limited to the risks of medications given for surgery, the risk of blood loss during and after surgery that can lead to the need for blood products in certain situations, infection, damage to normal structures that can lead to long term problems of pain or dysfunction, wound healing complications, the possibility of nonunion/malunion of any osteotomies and late or chronic pain as a result of the surgical intervention.  In addition potentially life threatening complications that can occur at the time of surgery and after surgery were discussed including but not limited to deep vein thrombosis, pulmonary embolism,  myocardial infarction, stroke and death.      Discussed activities to avoid as well as importance of using pain as a guide    We will provide a prescription for Percocet 21 tabs the day prior to surgery.  The patient will pick this up today before surgery in anticipation for surgery/postoperative pain control.

## 2025-04-16 NOTE — H&P (VIEW-ONLY)
History of Present Illness:  The patient presents today complaining of left elbow pain    Patient sustained a hyperextension type injury to his left elbow while attempting to restrain his dog.  This occurred 3/4/2025.  Did get an MRI this was consistent with partial biceps tendon tear.  I did recommend we will proceed with conservative treatment last visit given these findings.  Unfortunately patient sustained a fall about 3 weeks ago continues to have elbow pain and even worsening.  Pain particular with supination type activities.  Of note patient recently did get cardiac catheterization subsequent stent placement.    Presents today for repeat evaluation of follow-up and discussion of MRI results continues have significant elbow pain preventing him from enjoying activities and enjoys particular with supination type activities.      No past medical history on file.  Past Surgical History:   Procedure Laterality Date    OTHER SURGICAL HISTORY  03/03/2022    Varicose vein ligation    OTHER SURGICAL HISTORY  03/03/2022    Arterial stent placement    OTHER SURGICAL HISTORY  03/03/2022    Back surgery    OTHER SURGICAL HISTORY  03/03/2022    Wrist arthroscopy       Current Outpatient Medications:     amLODIPine (Norvasc) 2.5 mg tablet, Take 1 tablet (2.5 mg) by mouth once daily., Disp: , Rfl:     aspirin (Munira Low Dose Aspirin) 81 mg EC tablet, Take 1 tablet (81 mg) by mouth once daily., Disp: , Rfl:     atorvastatin (Lipitor) 40 mg tablet, Take 1 tablet (40 mg) by mouth once daily., Disp: , Rfl:     cetirizine (ZyrTEC) 10 mg tablet, Take 1 tablet (10 mg) by mouth once daily., Disp: , Rfl:     clopidogrel (Plavix) 75 mg tablet, Take 1 tablet (75 mg) by mouth once daily., Disp: , Rfl:     gabapentin (Neurontin) 300 mg capsule, Take 1 capsule (300 mg) by mouth 3 times a day., Disp: , Rfl:     hydroCHLOROthiazide (Microzide) 12.5 mg capsule, Take 1 capsule (12.5 mg) by mouth once daily in the morning., Disp: , Rfl:      losartan (Cozaar) 100 mg tablet, Take 1 tablet (100 mg) by mouth once daily., Disp: , Rfl:     losartan-hydrochlorothiazide (Hyzaar) 100-25 mg tablet, Take 1 tablet by mouth once daily., Disp: , Rfl:     metFORMIN  mg 24 hr tablet, Take 1 tablet (500 mg) by mouth once daily in the evening. Take with meals., Disp: , Rfl:     metoprolol succinate XL (Toprol-XL) 50 mg 24 hr tablet, Take 1 tablet (50 mg) by mouth once daily., Disp: , Rfl:     nitroglycerin (Nitrostat) 0.4 mg SL tablet, Place 1 tablet (0.4 mg) under the tongue every 5 minutes if needed for chest pain., Disp: , Rfl:     omeprazole (PriLOSEC) 40 mg DR capsule, Take 1 capsule (40 mg) by mouth once daily in the morning. Take before meals., Disp: , Rfl:     Ozempic 2 mg/dose (8 mg/3 mL) pen injector, 1 (one) time per week., Disp: , Rfl:     pantoprazole (ProtoNix) 40 mg EC tablet, Take 1 tablet (40 mg) by mouth once daily in the morning. Take before meals., Disp: , Rfl:     tamsulosin (Flomax) 0.4 mg 24 hr capsule, Take 1 capsule (0.4 mg) by mouth early in the morning.., Disp: , Rfl:     tiZANidine (Zanaflex) 2 mg capsule, Take 1 capsule (2 mg) by mouth 3 times a day., Disp: , Rfl:     traMADol (Ultram) 50 mg tablet, Take 1 tablet (50 mg) by mouth every 8 hours., Disp: , Rfl:     Review of Systems   GENERAL: Negative for malaise, significant weight loss, fever  MUSCULOSKELETAL: see HPI  NEURO:  Negative    Physical Examination:  Left Elbow:  Skin healthy and intact  Swelling and ecchymosis noted   Tolerates ROM with no gross defecits  Pain with resisted flexion / supination   + hook test  Normal neurovascular exam distally     Imaging:  MR elbow left wo IV contrast    Result Date: 3/6/2025  Interpreted By:  Deborah Dumont  and Erik Bhatti STUDY: MRI of the left elbow with out IV contrast;  3/6/2025 10:10 am   INDICATION: Signs/Symptoms:Pain   COMPARISON: Left elbow radiograph 03/05/2025   ACCESSION NUMBER(S): [id number]   ORDERING CLINICIAN:  CAROLYN MORALES   TECHNIQUE: Multiplanar multisequence MRI of the left elbow was performed without intravenous contrast.   FINDINGS: Tendons:  The common flexor tendon origin is intact. There is focal hyperintense T2 signal at the origin of the common extensor tendon superimposed on moderate thickening with intermediate T2 signal. No significant surrounding soft tissue edema. The triceps tendon insertion is intact. There is focal discontinuity of the radial tuberosity attachment of the short head of the biceps with intact long head attachment, best appreciated on sagittal image 16. The biceps tendon insertion on the radial tuberosity remains intact. The brachialis insertion is intact.   Ligaments:  The ulnar collateral ligament is intact. The lateral ulnar collateral ligament is intact. The radial collateral ligament is intact.   Joints:  There is no dislocation. The articular cartilage is intact. There is no osteochondral defect.   Joint effusion/Bursa:  There is a small elbow joint effusion. There is no olecranon bursitis.There is also a small amount of fluid within the bicipitoradial bursa.   Muscles:  Musculature is normal without tear or atrophy.   Nerves:  The ulnar nerve is normal.   Bone Marrow:  The bone marrow signal is normal. There is no fracture or contusion. There is no marrow replacing lesions.       1. Partial-thickness tear of the short head of the biceps near radial tuberosity attachment with intact long head. No significant retraction of the torn fibers. 2. Mild reactive bicipitoradial bursitis. 3. Chronic appearing low-grade partial-thickness tear of the origin of the common extensor tendon superimposed on moderate tendinosis.   I personally reviewed the images/study and I agree with Dr. Magy Valencia and the findings as stated.   MACRO: None.   Signed by: Deborah Dumont 3/6/2025 11:29 AM Dictation workstation:   [id number]    Assessment:  Patient with left elbow high-grade distal biceps  tendon tear    Plan:  We reviewed the disease process with the patient.   I did review MRI today with Luciano.  I do not appreciate worsening of partial biceps tendon tear.  He does have significant weakness on examination with resisted flexion as well as resisted supination.  Feels like he has not really made any improvement over the last 6 weeks time is concerned that he is not can be able to garden and do activities that he enjoys.  Given this he does wish to proceed with further treatment to include distal biceps tendon repair discussed risk of the surgery to include but not limited to neurovascular compromise namely PIN nerve, infection.  Using shared informed tissue making he does wish to proceed    Based on history and physical exam as well as imaging findings recommend surgical intervention to include open distal biceps tendon repair  .  Risk benefits and alternatives to treatment were discussed.  Particular risks of the surgery include infection, malunion, periprosthetic fracture, neurovascular compromise, stiffness.  Despite these risks, patient wishes to proceed.  Operative benefits include restoration of anatomy postoperative restrictions and limitations were reviewed in detail.  Patient will schedule surgery at their earliest convenience.      The risks of surgery were discussed including but not limited to the risks of medications given for surgery, the risk of blood loss during and after surgery that can lead to the need for blood products in certain situations, infection, damage to normal structures that can lead to long term problems of pain or dysfunction, wound healing complications, the possibility of nonunion/malunion of any osteotomies and late or chronic pain as a result of the surgical intervention.  In addition potentially life threatening complications that can occur at the time of surgery and after surgery were discussed including but not limited to deep vein thrombosis, pulmonary embolism,  myocardial infarction, stroke and death.      Discussed activities to avoid as well as importance of using pain as a guide    We will provide a prescription for Percocet 21 tabs the day prior to surgery.  The patient will pick this up today before surgery in anticipation for surgery/postoperative pain control.

## 2025-04-17 ENCOUNTER — HOSPITAL ENCOUNTER (OUTPATIENT)
Dept: CARDIOLOGY | Facility: HOSPITAL | Age: 65
Discharge: HOME | End: 2025-04-17
Payer: MEDICARE

## 2025-04-17 ENCOUNTER — LAB (OUTPATIENT)
Dept: LAB | Facility: HOSPITAL | Age: 65
End: 2025-04-17
Payer: MEDICARE

## 2025-04-17 DIAGNOSIS — Z01.818 PRE-OP EXAMINATION: ICD-10-CM

## 2025-04-17 PROBLEM — S46.212A STRAIN OF MUSCLE, FASCIA AND TENDON OF OTHER PARTS OF BICEPS, LEFT ARM, INITIAL ENCOUNTER: Status: ACTIVE | Noted: 2025-05-01

## 2025-04-17 LAB
ALBUMIN SERPL BCP-MCNC: 4.1 G/DL (ref 3.4–5)
ALP SERPL-CCNC: 69 U/L (ref 33–136)
ALT SERPL W P-5'-P-CCNC: 19 U/L (ref 10–52)
ANION GAP SERPL CALC-SCNC: 11 MMOL/L (ref 10–20)
APTT PPP: 28 SECONDS (ref 26–36)
AST SERPL W P-5'-P-CCNC: 15 U/L (ref 9–39)
BASOPHILS # BLD AUTO: 0.06 X10*3/UL (ref 0–0.1)
BASOPHILS NFR BLD AUTO: 0.7 %
BILIRUB SERPL-MCNC: 0.5 MG/DL (ref 0–1.2)
BUN SERPL-MCNC: 14 MG/DL (ref 6–23)
CALCIUM SERPL-MCNC: 9.2 MG/DL (ref 8.6–10.3)
CHLORIDE SERPL-SCNC: 103 MMOL/L (ref 98–107)
CO2 SERPL-SCNC: 27 MMOL/L (ref 21–32)
CREAT SERPL-MCNC: 0.93 MG/DL (ref 0.5–1.3)
EGFRCR SERPLBLD CKD-EPI 2021: >90 ML/MIN/1.73M*2
EOSINOPHIL # BLD AUTO: 0.18 X10*3/UL (ref 0–0.7)
EOSINOPHIL NFR BLD AUTO: 2 %
ERYTHROCYTE [DISTWIDTH] IN BLOOD BY AUTOMATED COUNT: 12.8 % (ref 11.5–14.5)
EST. AVERAGE GLUCOSE BLD GHB EST-MCNC: 134 MG/DL
GLUCOSE SERPL-MCNC: 112 MG/DL (ref 74–99)
HBA1C MFR BLD: 6.3 % (ref ?–5.7)
HCT VFR BLD AUTO: 41.5 % (ref 41–52)
HGB BLD-MCNC: 14.1 G/DL (ref 13.5–17.5)
IMM GRANULOCYTES # BLD AUTO: 0.04 X10*3/UL (ref 0–0.7)
IMM GRANULOCYTES NFR BLD AUTO: 0.4 % (ref 0–0.9)
INR PPP: 1 (ref 0.9–1.1)
LYMPHOCYTES # BLD AUTO: 2.34 X10*3/UL (ref 1.2–4.8)
LYMPHOCYTES NFR BLD AUTO: 25.8 %
MCH RBC QN AUTO: 29.8 PG (ref 26–34)
MCHC RBC AUTO-ENTMCNC: 34 G/DL (ref 32–36)
MCV RBC AUTO: 88 FL (ref 80–100)
MONOCYTES # BLD AUTO: 0.67 X10*3/UL (ref 0.1–1)
MONOCYTES NFR BLD AUTO: 7.4 %
NEUTROPHILS # BLD AUTO: 5.78 X10*3/UL (ref 1.2–7.7)
NEUTROPHILS NFR BLD AUTO: 63.7 %
NRBC BLD-RTO: 0 /100 WBCS (ref 0–0)
PLATELET # BLD AUTO: 233 X10*3/UL (ref 150–450)
POTASSIUM SERPL-SCNC: 4.2 MMOL/L (ref 3.5–5.3)
PROT SERPL-MCNC: 6.7 G/DL (ref 6.4–8.2)
PROTHROMBIN TIME: 11.4 SECONDS (ref 9.8–12.4)
RBC # BLD AUTO: 4.73 X10*6/UL (ref 4.5–5.9)
SODIUM SERPL-SCNC: 137 MMOL/L (ref 136–145)
WBC # BLD AUTO: 9.1 X10*3/UL (ref 4.4–11.3)

## 2025-04-17 PROCEDURE — 93005 ELECTROCARDIOGRAM TRACING: CPT

## 2025-04-17 PROCEDURE — 93010 ELECTROCARDIOGRAM REPORT: CPT | Performed by: INTERNAL MEDICINE

## 2025-04-17 PROCEDURE — 85730 THROMBOPLASTIN TIME PARTIAL: CPT

## 2025-04-17 PROCEDURE — 85025 COMPLETE CBC W/AUTO DIFF WBC: CPT

## 2025-04-17 PROCEDURE — 83036 HEMOGLOBIN GLYCOSYLATED A1C: CPT | Mod: ELYLAB

## 2025-04-17 PROCEDURE — 80053 COMPREHEN METABOLIC PANEL: CPT

## 2025-04-17 PROCEDURE — 85610 PROTHROMBIN TIME: CPT

## 2025-04-17 PROCEDURE — 36415 COLL VENOUS BLD VENIPUNCTURE: CPT

## 2025-04-20 LAB
ATRIAL RATE: 78 BPM
P AXIS: 74 DEGREES
P OFFSET: 190 MS
P ONSET: 134 MS
PR INTERVAL: 184 MS
Q ONSET: 226 MS
QRS COUNT: 13 BEATS
QRS DURATION: 128 MS
QT INTERVAL: 390 MS
QTC CALCULATION(BAZETT): 444 MS
QTC FREDERICIA: 425 MS
R AXIS: 41 DEGREES
T AXIS: 34 DEGREES
T OFFSET: 421 MS
VENTRICULAR RATE: 78 BPM

## 2025-04-21 RX ORDER — TIRZEPATIDE 5 MG/.5ML
INJECTION, SOLUTION SUBCUTANEOUS WEEKLY
COMMUNITY
Start: 2025-03-14

## 2025-04-21 RX ORDER — PRAVASTATIN SODIUM 40 MG/1
40 TABLET ORAL NIGHTLY
COMMUNITY

## 2025-04-21 NOTE — PREPROCEDURE INSTRUCTIONS

## 2025-04-30 ENCOUNTER — ANESTHESIA EVENT (OUTPATIENT)
Dept: OPERATING ROOM | Facility: HOSPITAL | Age: 65
End: 2025-04-30
Payer: MEDICARE

## 2025-04-30 DIAGNOSIS — S46.119A TRAUMATIC PARTIAL TEAR OF BICEPS TENDON, INITIAL ENCOUNTER: ICD-10-CM

## 2025-04-30 RX ORDER — OXYCODONE AND ACETAMINOPHEN 5; 325 MG/1; MG/1
1 TABLET ORAL EVERY 6 HOURS PRN
Qty: 21 TABLET | Refills: 0 | Status: SHIPPED | OUTPATIENT
Start: 2025-04-30

## 2025-04-30 NOTE — DISCHARGE INSTRUCTIONS
POST-OPERATIVE INSTRUCTIONS:  DISTAL BICEPS REPAIR  Dr. Ranjith Vargas III, MD    ACTIVITY  ·You may not bear weight on the operated arm (lean on arm, push off of a surface, carry objects) until cleared by .  ·You should move (straighten and bend) your fingers at least 10 times per day within your comfort to decrease swelling and prevent stiffness.  ·You may move your shoulder (raise it overhead) and elbow (bend and straighten) within your comfort to decrease swelling and prevent stiffness.  ·You may be able to do some typing or writing right after surgery. But, swelling or stiffness may make it hard to do these things for 3 to 4 weeks after surgery.    PAIN & INFLAMMATION CONTROL    ·Elevation - You may notice that your fingers will get swollen if your arm is hanging by your side for longs periods of time, therefore keep your arm elevated above your heart as much as possible for the first 2 to 3 days.  ·Ice - You may use an ice pack for up to 20 minutes at a time over the surgical dressing to help reduce swelling in your hand. Place a thin cloth between the ice pack and your skin or dressing to protect your skin.  ·Medication  You will be prescribed Percocet or Norco, take this as prescribed. Use over the counter ibuprofen as well, every 6 hours alternating this and your prescribed pain medication every 6 hours  Common side effects of the pain medication are nausea, drowsiness, and constipation.  Please call the clinic if you are experiencing trouble with any of these symptoms. Take stool softeners as instructed over the counter.  Do not drive a car or operate machinery while taking narcotic pain medications.  If you think you will require a refill on your medication, you MUST do so during our regular weekday office hours  Ok to take Ibuprofen 400mg every 6 hours. This will provide more pain control. Recommend taking Percocet or Norco (whatever is prescribed) at time 0, then 3 hours later take  ibuprofen, 3 hours later take Percocet etc etc. This way you will have pain medication in your body at all times.    EMERGENCIES  ·Please have someone care for you at least 24-48 hours after the surgery  ·A small amount of red-tinged drainage on your dressings can be normal.  If the drainage soaks your dressings, continues to expand, is foul-smelling, or your incisions are very red please call the office.  ·If you develop a fever (>101.5°) or chills please call the office.   ·If you experience leg or calf pain, leg swelling, chest pain or difficulty breathing please call the clinic, or after hours go to the emergency room.    FOLLOW-UP CARE  ·If not already scheduled, please call the office to schedule a follow-up appointment for splint removal 10-14 days postoperatively.     ·In most cases you will be converted to a removable splint at that time and begin physical therapy.     ·Do not do any weight-lifting or strengthening exercises without talking with your surgeon or occupational therapist.    IF YOU HAVE ANY QUESTIONS OR CONCERNS, PLEASE FEEL FREE TO CALL THE OFFICE.  [phone]  Notes:           General Anesthesia Discharge Instructions    About this topic  You may need general anesthesia if you need to be asleep during a procedure. Your doctor will use drugs to block the signals that go from your nerves to your brain. Doctors give general anesthesia during a surgery or procedure to:  Allow you to sleep  Help your body be still  Relax your muscles  Help you to relax and be pain free  Keep you from remembering the surgery  Let the doctor manage your airway, breathing, and blood flow  The doctor or nurse anesthetist gives general anesthesia by a shot into your vein. Sometimes, you may breathe in a gas through a mask placed over your face.  What care is needed at home?  Ask your doctor what you need to do when you go home. Make sure you ask questions if you do not understand what the doctor says.  Your doctor  may give you drugs to prevent or treat an upset stomach from the anesthetic. Take them as ordered.  If your throat is sore, suck on ice chips or popsicles to ease throat pain.  Put 2 to 3 pillows under your head and back when you lie down to help you breathe easier.  For the first 24 to 48 hours:  Do not operate heavy or dangerous machinery.  Do not make major decisions or sign important papers. You may not be able to think clearly.  Avoid beer, wine, or mixed drinks.  You are at a higher risk of falling for at least 24 hours after general anesthesia.  Take extra care when you get up.  Do not change positions quickly.  Do not rush when you need to go to the bathroom or to answer the phone.  Ask for help if you feel unsteady when you try to walk.  Wear shoes with non-slip soles and low heels.  What follow-up care is needed?  Your doctor may ask you to come back to the office to check on your progress. Be sure to keep these visits.  If you have stitches that do not dissolve or staples, you will need to have them removed. Your doctor will want to do this in 1 to 2 weeks. If the doctor used skin glue, the glue will fall off on its own.  What drugs may be needed?  The doctor may order drugs to:  Help with pain  Treat an upset stomach or throwing up  Will physical activity be limited?  You will not be allowed to drive right away after the procedure. Ask a family member or a friend to drive you home.  Avoid trying to get out of bed without help until you are sure of your balance.  You may have to limit your activity. Talk to your doctor about if you need to limit how much you lift or limit exercise after your procedure.  What changes to diet are needed?  Start with a light diet when you are fully awake. This includes things that are easy to swallow like soups, pudding, jello, toast, and eggs. Slowly progress to your normal diet.  What problems could happen?  Low blood pressure  Breathing problems  Upset stomach or throwing  up  Dizziness  Blood clots  Infection  When do I need to call the doctor?  Trouble breathing  Upset stomach or throwing up more than 3 times in the next 2 days  Dizziness  Teach Back: Helping You Understand  The Teach Back Method helps you understand the information we are giving you. After you talk with the staff, tell them in your own words what you learned. This helps to make sure the staff has described each thing clearly. It also helps to explain things that may have been confusing. Before going home, make sure you can do these:  I can tell you about my procedure.  I can tell you if I need to follow up with my doctor.  I can tell you what is good for me to eat and drink the next day.  I can tell you what I would do if I have trouble breathing, an upset stomach, or dizziness.  Where can I learn more?  [employer]  https://www.nigms.nih.gov/education/pages/factsheet_Anesthesia.aspx  [facility] Choices  http://www.nhs.uk/conditions/Anaesthetic-general/Pages/Definition.aspx  Last Reviewed Date  2020-04-22    IMPLANT PROGRESS NOTE GIVEN TO PATIENT  SURGEON'S PHONE LIST PROVIDED TO PATIENT

## 2025-05-01 ENCOUNTER — ANESTHESIA (OUTPATIENT)
Dept: OPERATING ROOM | Facility: HOSPITAL | Age: 65
End: 2025-05-01
Payer: MEDICARE

## 2025-05-01 ENCOUNTER — HOSPITAL ENCOUNTER (OUTPATIENT)
Facility: HOSPITAL | Age: 65
Setting detail: OUTPATIENT SURGERY
Discharge: HOME | End: 2025-05-01
Attending: STUDENT IN AN ORGANIZED HEALTH CARE EDUCATION/TRAINING PROGRAM | Admitting: STUDENT IN AN ORGANIZED HEALTH CARE EDUCATION/TRAINING PROGRAM
Payer: MEDICARE

## 2025-05-01 VITALS
WEIGHT: 223.55 LBS | RESPIRATION RATE: 18 BRPM | SYSTOLIC BLOOD PRESSURE: 131 MMHG | HEIGHT: 66 IN | DIASTOLIC BLOOD PRESSURE: 60 MMHG | BODY MASS INDEX: 35.93 KG/M2 | OXYGEN SATURATION: 98 % | HEART RATE: 90 BPM | TEMPERATURE: 96.8 F

## 2025-05-01 DIAGNOSIS — S46.212A STRAIN OF MUSCLE, FASCIA AND TENDON OF OTHER PARTS OF BICEPS, LEFT ARM, INITIAL ENCOUNTER: Primary | ICD-10-CM

## 2025-05-01 LAB
GLUCOSE BLD MANUAL STRIP-MCNC: 110 MG/DL (ref 74–99)
GLUCOSE BLD MANUAL STRIP-MCNC: 118 MG/DL (ref 74–99)

## 2025-05-01 PROCEDURE — 7100000002 HC RECOVERY ROOM TIME - EACH INCREMENTAL 1 MINUTE: Performed by: STUDENT IN AN ORGANIZED HEALTH CARE EDUCATION/TRAINING PROGRAM

## 2025-05-01 PROCEDURE — 3600000003 HC OR TIME - INITIAL BASE CHARGE - PROCEDURE LEVEL THREE: Performed by: STUDENT IN AN ORGANIZED HEALTH CARE EDUCATION/TRAINING PROGRAM

## 2025-05-01 PROCEDURE — 24342 REPAIR OF RUPTURED TENDON: CPT | Performed by: STUDENT IN AN ORGANIZED HEALTH CARE EDUCATION/TRAINING PROGRAM

## 2025-05-01 PROCEDURE — 7100000009 HC PHASE TWO TIME - INITIAL BASE CHARGE: Performed by: STUDENT IN AN ORGANIZED HEALTH CARE EDUCATION/TRAINING PROGRAM

## 2025-05-01 PROCEDURE — 2720000007 HC OR 272 NO HCPCS: Performed by: STUDENT IN AN ORGANIZED HEALTH CARE EDUCATION/TRAINING PROGRAM

## 2025-05-01 PROCEDURE — 2500000004 HC RX 250 GENERAL PHARMACY W/ HCPCS (ALT 636 FOR OP/ED)

## 2025-05-01 PROCEDURE — 3700000001 HC GENERAL ANESTHESIA TIME - INITIAL BASE CHARGE: Performed by: STUDENT IN AN ORGANIZED HEALTH CARE EDUCATION/TRAINING PROGRAM

## 2025-05-01 PROCEDURE — 7100000001 HC RECOVERY ROOM TIME - INITIAL BASE CHARGE: Performed by: STUDENT IN AN ORGANIZED HEALTH CARE EDUCATION/TRAINING PROGRAM

## 2025-05-01 PROCEDURE — 2500000004 HC RX 250 GENERAL PHARMACY W/ HCPCS (ALT 636 FOR OP/ED): Mod: JZ | Performed by: ANESTHESIOLOGY

## 2025-05-01 PROCEDURE — 7100000010 HC PHASE TWO TIME - EACH INCREMENTAL 1 MINUTE: Performed by: STUDENT IN AN ORGANIZED HEALTH CARE EDUCATION/TRAINING PROGRAM

## 2025-05-01 PROCEDURE — 2500000004 HC RX 250 GENERAL PHARMACY W/ HCPCS (ALT 636 FOR OP/ED): Mod: JZ

## 2025-05-01 PROCEDURE — 24342 REPAIR OF RUPTURED TENDON: CPT

## 2025-05-01 PROCEDURE — 2500000005 HC RX 250 GENERAL PHARMACY W/O HCPCS

## 2025-05-01 PROCEDURE — C1713 ANCHOR/SCREW BN/BN,TIS/BN: HCPCS | Performed by: STUDENT IN AN ORGANIZED HEALTH CARE EDUCATION/TRAINING PROGRAM

## 2025-05-01 PROCEDURE — 3700000002 HC GENERAL ANESTHESIA TIME - EACH INCREMENTAL 1 MINUTE: Performed by: STUDENT IN AN ORGANIZED HEALTH CARE EDUCATION/TRAINING PROGRAM

## 2025-05-01 PROCEDURE — 2500000005 HC RX 250 GENERAL PHARMACY W/O HCPCS: Mod: JZ | Performed by: STUDENT IN AN ORGANIZED HEALTH CARE EDUCATION/TRAINING PROGRAM

## 2025-05-01 PROCEDURE — 3600000008 HC OR TIME - EACH INCREMENTAL 1 MINUTE - PROCEDURE LEVEL THREE: Performed by: STUDENT IN AN ORGANIZED HEALTH CARE EDUCATION/TRAINING PROGRAM

## 2025-05-01 PROCEDURE — 2780000003 HC OR 278 NO HCPCS: Performed by: STUDENT IN AN ORGANIZED HEALTH CARE EDUCATION/TRAINING PROGRAM

## 2025-05-01 PROCEDURE — 2500000004 HC RX 250 GENERAL PHARMACY W/ HCPCS (ALT 636 FOR OP/ED): Performed by: ANESTHESIOLOGY

## 2025-05-01 PROCEDURE — 82947 ASSAY GLUCOSE BLOOD QUANT: CPT

## 2025-05-01 DEVICE — PROXIMAL TENODESIS IMPLANT SYSTEM REV: 0
Type: IMPLANTABLE DEVICE | Site: ELBOW | Status: FUNCTIONAL
Brand: ARTHREX®

## 2025-05-01 RX ORDER — OXYCODONE HYDROCHLORIDE 5 MG/1
10 TABLET ORAL EVERY 4 HOURS PRN
Status: DISCONTINUED | OUTPATIENT
Start: 2025-05-01 | End: 2025-05-01 | Stop reason: HOSPADM

## 2025-05-01 RX ORDER — ACETAMINOPHEN 325 MG/1
650 TABLET ORAL EVERY 4 HOURS PRN
Status: DISCONTINUED | OUTPATIENT
Start: 2025-05-01 | End: 2025-05-01 | Stop reason: HOSPADM

## 2025-05-01 RX ORDER — PROPOFOL 10 MG/ML
INJECTION, EMULSION INTRAVENOUS AS NEEDED
Status: DISCONTINUED | OUTPATIENT
Start: 2025-05-01 | End: 2025-05-01

## 2025-05-01 RX ORDER — LIDOCAINE HYDROCHLORIDE 20 MG/ML
INJECTION, SOLUTION INFILTRATION; PERINEURAL AS NEEDED
Status: DISCONTINUED | OUTPATIENT
Start: 2025-05-01 | End: 2025-05-01

## 2025-05-01 RX ORDER — ALBUTEROL SULFATE 0.83 MG/ML
2.5 SOLUTION RESPIRATORY (INHALATION) ONCE AS NEEDED
Status: DISCONTINUED | OUTPATIENT
Start: 2025-05-01 | End: 2025-05-01 | Stop reason: HOSPADM

## 2025-05-01 RX ORDER — ONDANSETRON HYDROCHLORIDE 2 MG/ML
INJECTION, SOLUTION INTRAVENOUS AS NEEDED
Status: DISCONTINUED | OUTPATIENT
Start: 2025-05-01 | End: 2025-05-01

## 2025-05-01 RX ORDER — FENTANYL CITRATE 50 UG/ML
INJECTION, SOLUTION INTRAMUSCULAR; INTRAVENOUS AS NEEDED
Status: DISCONTINUED | OUTPATIENT
Start: 2025-05-01 | End: 2025-05-01

## 2025-05-01 RX ORDER — MEPERIDINE HYDROCHLORIDE 25 MG/ML
12.5 INJECTION INTRAMUSCULAR; INTRAVENOUS; SUBCUTANEOUS EVERY 10 MIN PRN
Status: DISCONTINUED | OUTPATIENT
Start: 2025-05-01 | End: 2025-05-01 | Stop reason: HOSPADM

## 2025-05-01 RX ORDER — DIPHENHYDRAMINE HYDROCHLORIDE 50 MG/ML
12.5 INJECTION, SOLUTION INTRAMUSCULAR; INTRAVENOUS ONCE AS NEEDED
Status: DISCONTINUED | OUTPATIENT
Start: 2025-05-01 | End: 2025-05-01 | Stop reason: HOSPADM

## 2025-05-01 RX ORDER — LIDOCAINE HYDROCHLORIDE 10 MG/ML
0.1 INJECTION, SOLUTION EPIDURAL; INFILTRATION; INTRACAUDAL; PERINEURAL ONCE
Status: DISCONTINUED | OUTPATIENT
Start: 2025-05-01 | End: 2025-05-01 | Stop reason: HOSPADM

## 2025-05-01 RX ORDER — SODIUM CHLORIDE, SODIUM LACTATE, POTASSIUM CHLORIDE, CALCIUM CHLORIDE 600; 310; 30; 20 MG/100ML; MG/100ML; MG/100ML; MG/100ML
100 INJECTION, SOLUTION INTRAVENOUS CONTINUOUS
Status: DISCONTINUED | OUTPATIENT
Start: 2025-05-01 | End: 2025-05-01 | Stop reason: HOSPADM

## 2025-05-01 RX ORDER — SODIUM CHLORIDE, SODIUM LACTATE, POTASSIUM CHLORIDE, CALCIUM CHLORIDE 600; 310; 30; 20 MG/100ML; MG/100ML; MG/100ML; MG/100ML
50 INJECTION, SOLUTION INTRAVENOUS CONTINUOUS
Status: DISCONTINUED | OUTPATIENT
Start: 2025-05-01 | End: 2025-05-01 | Stop reason: HOSPADM

## 2025-05-01 RX ORDER — METOPROLOL TARTRATE 1 MG/ML
5 INJECTION, SOLUTION INTRAVENOUS ONCE
Status: DISCONTINUED | OUTPATIENT
Start: 2025-05-01 | End: 2025-05-01 | Stop reason: HOSPADM

## 2025-05-01 RX ORDER — ROCURONIUM BROMIDE 10 MG/ML
INJECTION, SOLUTION INTRAVENOUS AS NEEDED
Status: DISCONTINUED | OUTPATIENT
Start: 2025-05-01 | End: 2025-05-01

## 2025-05-01 RX ORDER — FENTANYL CITRATE 50 UG/ML
25 INJECTION, SOLUTION INTRAMUSCULAR; INTRAVENOUS EVERY 5 MIN PRN
Status: DISCONTINUED | OUTPATIENT
Start: 2025-05-01 | End: 2025-05-01 | Stop reason: HOSPADM

## 2025-05-01 RX ORDER — SUCCINYLCHOLINE CHLORIDE 100 MG/5ML
SYRINGE (ML) INTRAVENOUS AS NEEDED
Status: DISCONTINUED | OUTPATIENT
Start: 2025-05-01 | End: 2025-05-01

## 2025-05-01 RX ORDER — CEFAZOLIN SODIUM 2 G/50ML
2 SOLUTION INTRAVENOUS ONCE
Status: COMPLETED | OUTPATIENT
Start: 2025-05-01 | End: 2025-05-01

## 2025-05-01 RX ORDER — FAMOTIDINE 10 MG/ML
INJECTION INTRAVENOUS AS NEEDED
Status: DISCONTINUED | OUTPATIENT
Start: 2025-05-01 | End: 2025-05-01

## 2025-05-01 RX ORDER — ONDANSETRON HYDROCHLORIDE 2 MG/ML
4 INJECTION, SOLUTION INTRAVENOUS ONCE AS NEEDED
Status: DISCONTINUED | OUTPATIENT
Start: 2025-05-01 | End: 2025-05-01 | Stop reason: HOSPADM

## 2025-05-01 RX ORDER — MIDAZOLAM HYDROCHLORIDE 1 MG/ML
INJECTION, SOLUTION INTRAMUSCULAR; INTRAVENOUS AS NEEDED
Status: DISCONTINUED | OUTPATIENT
Start: 2025-05-01 | End: 2025-05-01

## 2025-05-01 RX ORDER — OXYCODONE HYDROCHLORIDE 5 MG/1
5 TABLET ORAL EVERY 4 HOURS PRN
Status: DISCONTINUED | OUTPATIENT
Start: 2025-05-01 | End: 2025-05-01 | Stop reason: HOSPADM

## 2025-05-01 RX ORDER — SODIUM CHLORIDE 0.9 G/100ML
INJECTION, SOLUTION IRRIGATION AS NEEDED
Status: DISCONTINUED | OUTPATIENT
Start: 2025-05-01 | End: 2025-05-01 | Stop reason: HOSPADM

## 2025-05-01 RX ADMIN — MIDAZOLAM 2 MG: 1 INJECTION INTRAMUSCULAR; INTRAVENOUS at 11:51

## 2025-05-01 RX ADMIN — ROCURONIUM BROMIDE 50 MG: 10 SOLUTION INTRAVENOUS at 12:22

## 2025-05-01 RX ADMIN — FAMOTIDINE 20 MG: 10 INJECTION, SOLUTION INTRAVENOUS at 12:33

## 2025-05-01 RX ADMIN — SODIUM CHLORIDE, POTASSIUM CHLORIDE, SODIUM LACTATE AND CALCIUM CHLORIDE: 600; 310; 30; 20 INJECTION, SOLUTION INTRAVENOUS at 12:08

## 2025-05-01 RX ADMIN — SUGAMMADEX 200 MG: 100 INJECTION, SOLUTION INTRAVENOUS at 13:09

## 2025-05-01 RX ADMIN — SODIUM CHLORIDE, POTASSIUM CHLORIDE, SODIUM LACTATE AND CALCIUM CHLORIDE: 600; 310; 30; 20 INJECTION, SOLUTION INTRAVENOUS at 12:38

## 2025-05-01 RX ADMIN — PROPOFOL 200 MG: 10 INJECTION, EMULSION INTRAVENOUS at 12:18

## 2025-05-01 RX ADMIN — Medication 100 MG: at 12:20

## 2025-05-01 RX ADMIN — FENTANYL CITRATE 25 MCG: 50 INJECTION INTRAMUSCULAR; INTRAVENOUS at 12:34

## 2025-05-01 RX ADMIN — DEXAMETHASONE SODIUM PHOSPHATE 4 MG: 4 INJECTION, SOLUTION INTRAMUSCULAR; INTRAVENOUS at 12:33

## 2025-05-01 RX ADMIN — LIDOCAINE HYDROCHLORIDE 100 MG: 20 INJECTION, SOLUTION INFILTRATION; PERINEURAL at 12:18

## 2025-05-01 RX ADMIN — ONDANSETRON 4 MG: 2 INJECTION, SOLUTION INTRAMUSCULAR; INTRAVENOUS at 12:33

## 2025-05-01 RX ADMIN — CEFAZOLIN SODIUM 2 G: 2 SOLUTION INTRAVENOUS at 12:23

## 2025-05-01 SDOH — HEALTH STABILITY: MENTAL HEALTH: CURRENT SMOKER: 0

## 2025-05-01 ASSESSMENT — PAIN SCALES - GENERAL
PAINLEVEL_OUTOF10: 0 - NO PAIN
PAINLEVEL_OUTOF10: 0 - NO PAIN
PAINLEVEL_OUTOF10: 2
PAIN_LEVEL: 0
PAINLEVEL_OUTOF10: 5 - MODERATE PAIN
PAINLEVEL_OUTOF10: 2
PAINLEVEL_OUTOF10: 0 - NO PAIN

## 2025-05-01 ASSESSMENT — COLUMBIA-SUICIDE SEVERITY RATING SCALE - C-SSRS
6. HAVE YOU EVER DONE ANYTHING, STARTED TO DO ANYTHING, OR PREPARED TO DO ANYTHING TO END YOUR LIFE?: NO
1. IN THE PAST MONTH, HAVE YOU WISHED YOU WERE DEAD OR WISHED YOU COULD GO TO SLEEP AND NOT WAKE UP?: NO
2. HAVE YOU ACTUALLY HAD ANY THOUGHTS OF KILLING YOURSELF?: NO

## 2025-05-01 ASSESSMENT — PAIN - FUNCTIONAL ASSESSMENT
PAIN_FUNCTIONAL_ASSESSMENT: 0-10

## 2025-05-01 ASSESSMENT — PAIN DESCRIPTION - DESCRIPTORS
DESCRIPTORS: NUMBNESS;TINGLING
DESCRIPTORS: NUMBNESS
DESCRIPTORS: TINGLING;NUMBNESS

## 2025-05-01 NOTE — ANESTHESIA PROCEDURE NOTES
Airway  Date/Time: 5/1/2025 12:21 PM  Reason: elective    Airway not difficult (Pt know diffcult DL airway. Performed video laryngoscopy.)    Staffing  Performed: SRNA, CRNA and attending   Authorized by: Leeann Coates MD    Performed by: Tavon Carpio RN  Patient location during procedure: OR    Patient Condition  Indications for airway management: anesthesia  Patient position: ramp  Planned trial extubation  Sedation level: deep     Final Airway Details  Final airway type: endotracheal airway  Successful airway: ETT  Cuffed: yes   Successful intubation technique: video laryngoscopy ([facility])  Adjuncts used in placement: intubating stylet and cricoid pressure  Endotracheal tube insertion site: oral  Blade: Renee ([facility])  Blade size: #4  ETT size (mm): 7.5  Cormack-Lehane Classification: grade IIa - partial view of glottis  Placement verified by: chest auscultation and capnometry   Measured from: lips  ETT to lips (cm): 23  Number of attempts at approach: 1

## 2025-05-01 NOTE — ANESTHESIA PREPROCEDURE EVALUATION
Patient: Luciano Samaniego    Procedure Information       Date/Time: 05/01/25 1227    Procedure: OPEN DISTAL BICEPS TENDON REPAIR (Left: Arm Upper) - DIABETIC, ARTHREX    Location: [facility]    Surgeons: Ranjith Vargas MD            Relevant Problems   No relevant active problems       Clinical information reviewed:   Tobacco  Allergies  Meds  Problems  Med Hx  Surg Hx   Fam Hx  Soc   Hx        NPO Detail:  NPO/Void Status  Carbohydrate Drink Given Prior to Surgery? : N  Date of Last Liquid: 04/30/25  Time of Last Liquid: 2200  Date of Last Solid: 04/30/25  Time of Last Solid: 1900  Last Intake Type: Clear fluids  Time of Last Void: 0800         Physical Exam    Airway  Mallampati: II  TM distance: >3 FB  Neck ROM: full  Mouth opening: 3 or more finger widths     Cardiovascular - normal exam   Dental    Pulmonary - normal exam   Abdominal - normal exam           Anesthesia Plan    History of general anesthesia?: yes  History of complications of general anesthesia?: no    ASA 3     general   (Brachial Plexus Block for Postop Pain Management)  The patient is not a current smoker.  Patient did not smoke on day of procedure.    intravenous induction   Anesthetic plan and risks discussed with patient.    Plan discussed with CRNA and attending.

## 2025-05-01 NOTE — ANESTHESIA PROCEDURE NOTES
Peripheral Block    Patient location during procedure: holding area  Medication administered at: 5/1/2025 11:50 AM  Reason for block: at surgeon's request and post-op pain management  Staffing  Performed: attending   Authorized by: Leeann Coates MD    Performed by: Leeann Coates MD  Preanesthetic Checklist  Completed: patient identified, IV checked, site marked, risks and benefits discussed, surgical consent, monitors and equipment checked, pre-op evaluation and timeout performed   Timeout performed at: 5/1/2025 11:50 AM  Peripheral Block  Patient position: laying flat  Prep: ChloraPrep  Patient monitoring: heart rate, cardiac monitor and continuous pulse ox  Block type: supraclavicular  Laterality: left  Injection technique: single-shot  Guidance: nerve stimulator and ultrasound guided  Infiltration strength: 1 %  Dose: 4 mL  Needle  Needle gauge: 21 G  Needle length: 5 cm  Needle localization: ultrasound guidance     image stored in chart  Needle insertion depth: 4 cm  Test dose: negative  Assessment  Injection assessment: negative aspiration for heme, no paresthesia on injection, incremental injection and local visualized surrounding nerve on ultrasound  Paresthesia pain: none  Heart rate change: no  Slow fractionated injection: yes  Additional Notes  30 Ccs 0.5% ropivicaine + Epi 1:200,000 + 10 mg dexamethasone PF

## 2025-05-01 NOTE — OP NOTE
OPEN DISTAL BICEPS TENDON REPAIR (L) Operative Note    Date: 2025  OR Location: [facility]    Name: Luciano Samaniego, : 1960, Age: 64 y.o., MRN: [id number], Sex: male    Diagnosis  Pre-op Diagnosis      * Strain of muscle, fascia and tendon of other parts of biceps, left arm, initial encounter [S46.212A] High-grade distal biceps tendon rupture near complete left arm     Procedures  Open distal biceps tendon repair 08303      Surgeons      * Ranjith Vargas - Primary    Resident/Fellow/Other Assistant:  Surgeons and Role:     * Mitch Vegas PA-C - Assisting    Staff:   Circulator: Priscila Ruiz Person: Celia    Anesthesia Staff: Anesthesiologist: Leeann Coates MD  CRNA: BHAVANA Rice-ELLIOT  SRNA: Tavon Carpio RN    Procedure Summary  Anesthesia: General  ASA: III  Estimated Blood Loss: 10mL  Intra-op Medications:   Administrations occurring from 1227 to 1357 on 25:   Medication Name Total Dose   sodium chloride 0.9 % irrigation solution 1,000 mL   dexAMETHasone (Decadron) 4 mg/mL 4 mg   famotidine (Pepcid) 10 mg/mL 20 mg   fentaNYL PF 0.05 mg/mL 25 mcg   lactated Ringer's infusion 65.83 mL   ondansetron 2 mg/mL 4 mg              Anesthesia Record               Intraprocedure I/O Totals          Intake    lactated Ringer's infusion 1000.00 mL    Total Intake 1000 mL          Specimen: No specimens collected                Drains and/or Catheters: * None in log *    Tourniquet Times:         Implants:  Implants       Type Name Action Serial No.      Implant KIT, IMPLANT SYSTEM, PROXIMAL TENODESIS - [id number] Implanted               Findings: see procedure details    Operative findings: Retracted complete distal biceps rupture.     Operative Indications: Patient is a 64-year-old male who sustained a distal biceps rupture.  Patient elects to proceed with surgical intervention.  I discussed with patient preoperatively the complications, limitations, expectations, alternatives and risks of surgical care  which patient has understood.  Patient understands a particular risks of iatrogenic injury to the lateral antebrachial cutaneous nerve, posterior interosseous nerve and anterior interosseous nerves. Patient understands that there is a risk of vascular injury, heterotopic ossification, iatrogenic fracture of his proximal radius, as well as tendon rerupture which may require additional surgery in the future.  No guarantees were given or implied.  Patient has provided written informed consent to proceed.    Implants:  Arthrex distal biceps button     Operative Procedure:  Luciano was identified in the preoperative area.  The operative site was initialed and consent was reviewed.  Final questions were answered.  Patient was brought to the operating room and placed in the supine position.  All bony prominences were padded.  A forementioned anesthesia was administered.  Antibiotics were confirmed to have been given.  The operative extremity was prepped and draped in this typical sterile orthopedic fashion.  A surgical timeout was then performed with the patient's identification, the procedure to be performed been reviewed, verification that the patient had received preoperative antibiotics and verification of the correct surgical site.  Fire risk was assessed.  A tourniquet was not utilized.    I used a 4 cm longitudinal incision centered over the proximal volar forearm 2 fingerbreadths distal to the antecubital crease.  Sharp dissection was carried down to the skin only and then blunt scissor dissection was then carried down to the volar forearm fascia.  The fascia was incised in line with the skin incision and then blunt dissection was carried down between the interval of the brachial radialis and pronator teres.  Small antecubital veins were coagulated with cautery.  With the forearm in full supination I was easily able to palpate the radial tuberosity which was completely devoid of any distal biceps fibers.    I placed  my finger proximally into the wound and identified the stump of the biceps tendon.  A clamp was placed around the tendon and was retrieved through the distal wound.  The ends of the distal biceps were quite bulbous in nature and therefore I did do gentle debridement of this until healthy tissue was obtained.  A #2 fiber loop suture was used to run a lock stitch through the distal 3 to 4 cm of tendon any disease tendon was sharply excised to the only healthy tendon remained.    I placed a thin Hohmann medially and a [employer] laterally to identify the radial tuberosity in max supination.  No deep retractors were placed radially to prevent iatrogenic injury to PIN.  I used a curette in order to debride any soft tissue and placed a 3.2 mm guidepin at the center center position of the tuberosity bicortically I used a large C arm in order to confirm appropriate position of the guidepin in the center of the biceps insertion point.  I used a 7 mm reamer to ream out the near cortex remove any bony debris using copious irrigation.  I loaded the biceps button onto the 2 limbs of suture and deployed the button across the far cortex.  I then tensioned each limb in order to draw the tendon stump into the medullary canal of the proximal radius.  Using alternating half hitches multiple knots were tied to secure the fixation. A free needle was used to pass 1 limb of suture back through the tendon or secure the construct with several square knots notably alternating half hitches. The elbow was taken through gentle range of motion to ensure stable fixation of construct.    The wound was subsequently copiously irrigated.  Hemostasis was obtained with Bovie cautery.  The wound was closed with 3-0 Vicryl, 4-0 Monocryl and Dermabond.  The patient was then placed in a well-padded posterior slab splint in about 90 degrees of flexion.  Patient was awoken from anesthesia with this no immediate complications     I was present and participated  in the entire procedure. The Physician Assistant participated in all critical elements of the procedure under my direct supervision. The surgical incision was closed by the PA under my indirect supervision. There were no qualified residents available to assist.     Complications: none  Estimated blood loss: minimal  Specimen: none        The physician assistant was present for the entire case.  Given the nature of the procedure and disease process a skilled surgical assistant was necessary for the case.  The assistant was necessary for retraction and helped directly facilitate completion of the surgery.  A certified scrub tech was at the back table managing instruments and supplies for the surgical procedure.       Postoperative plan  Patient was placed in a well-padded posterior slab splint we will remove this at next follow-up visit  Passive full flexion to 90 degrees extension locked for first 2 weeks and increase 10 degrees each week after.  Moved base 6 weeks.  Begin active at week 6.      Complications:  None; patient tolerated the procedure well.    Disposition: PACU - hemodynamically stable.  Condition: stable     Mitch Vegas PA-C was present for the entire case.  Given the nature of the procedure and disease process a skilled surgical assistant was necessary for the case.  The assistant was necessary for retraction and helped directly facilitate completion of the surgery.  A certified scrub tech was at the back table managing instruments and supplies for the surgical procedure.        Ranjith Vargas  Phone Number: [phone]

## 2025-05-01 NOTE — ANESTHESIA POSTPROCEDURE EVALUATION
Patient: Luciano Samaniego    Procedure Summary       Date: 05/01/25 Room / Location: [facility] / [facility]    Anesthesia Start: 1208 Anesthesia Stop:     Procedure: OPEN DISTAL BICEPS TENDON REPAIR (Left: Arm Upper) Diagnosis:       Strain of muscle, fascia and tendon of other parts of biceps, left arm, initial encounter      (Strain of muscle, fascia and tendon of other parts of biceps, left arm, initial encounter [S46.212A])    Surgeons: Ranjith Vargas MD Responsible Provider: Leeann Coates MD    Anesthesia Type: general ASA Status: 3            Anesthesia Type: general    Vitals Value Taken Time   /65 05/01/25 13:24   Temp 36.3 05/01/25 13:28   Pulse 92 05/01/25 13:26   Resp 22 05/01/25 13:26   SpO2 99 % 05/01/25 13:26   Vitals shown include unfiled device data.    Anesthesia Post Evaluation    Patient participation: complete - patient participated  Level of consciousness: awake and alert  Pain score: 0  Pain management: adequate  Airway patency: patent  Cardiovascular status: hemodynamically stable and stable  Respiratory status: nonlabored ventilation and spontaneous ventilation  Hydration status: euvolemic  Postoperative Nausea and Vomiting: none        There were no known notable events for this encounter.

## 2025-05-02 ENCOUNTER — TELEPHONE (OUTPATIENT)
Dept: ORTHOPEDIC SURGERY | Facility: CLINIC | Age: 65
End: 2025-05-02
Payer: MEDICARE

## 2025-05-02 NOTE — TELEPHONE ENCOUNTER
Pt called and states that he had surgery yesterday, May 1, 2025 and was told that he would have a post op visit on May 14, 2025, but there is nothing scheduled for him.  Please advise.

## 2025-05-05 ENCOUNTER — APPOINTMENT (OUTPATIENT)
Dept: ORTHOPEDIC SURGERY | Facility: CLINIC | Age: 65
End: 2025-05-05
Payer: MEDICARE

## 2025-05-14 ENCOUNTER — HOSPITAL ENCOUNTER (OUTPATIENT)
Dept: RADIOLOGY | Facility: CLINIC | Age: 65
Discharge: HOME | End: 2025-05-14
Payer: MEDICARE

## 2025-05-14 ENCOUNTER — OFFICE VISIT (OUTPATIENT)
Dept: ORTHOPEDIC SURGERY | Facility: CLINIC | Age: 65
End: 2025-05-14
Payer: MEDICARE

## 2025-05-14 DIAGNOSIS — S63.502A WRIST SPRAIN, LEFT, INITIAL ENCOUNTER: ICD-10-CM

## 2025-05-14 DIAGNOSIS — Z98.890 S/P TENDON REPAIR: Primary | ICD-10-CM

## 2025-05-14 DIAGNOSIS — S46.219A BICEPS TENDON TEAR: ICD-10-CM

## 2025-05-14 PROCEDURE — 99213 OFFICE O/P EST LOW 20 MIN: CPT | Performed by: STUDENT IN AN ORGANIZED HEALTH CARE EDUCATION/TRAINING PROGRAM

## 2025-05-14 PROCEDURE — 73110 X-RAY EXAM OF WRIST: CPT | Mod: LT

## 2025-05-14 PROCEDURE — 73110 X-RAY EXAM OF WRIST: CPT | Mod: LEFT SIDE | Performed by: STUDENT IN AN ORGANIZED HEALTH CARE EDUCATION/TRAINING PROGRAM

## 2025-05-14 PROCEDURE — L3761 EO, ADJ LOCK JOINT PREFAB OT: HCPCS | Performed by: STUDENT IN AN ORGANIZED HEALTH CARE EDUCATION/TRAINING PROGRAM

## 2025-05-14 PROCEDURE — 73080 X-RAY EXAM OF ELBOW: CPT | Mod: LT

## 2025-05-14 PROCEDURE — L3908 WHO COCK-UP NONMOLDE PRE OTS: HCPCS | Performed by: STUDENT IN AN ORGANIZED HEALTH CARE EDUCATION/TRAINING PROGRAM

## 2025-05-14 NOTE — PROGRESS NOTES
No chief complaint on file.      History of Present Illness   Patient returns today status post distal biceps tendon repair.  The patient endorses appropriate post-operative pain.  Does not mention any fever or chills.    Of note does endorse some new pain namely left wrist pain.  This has become more prevalent now that his biceps is fixed.  Presents today for evaluation of this new problem.  Thinks that he may have injured it from the beginning however now that his biceps feels much better this is becoming more prominent.     Exam  Healthy incision.  No erythema or drainage.  Range of motion: 10 to 100 degrees flexion and extension, supination to neutral, just about full pronation  +                     EPL, FPL, MARIAMA  +                     SILT median, ulnar, radial nerve distribution.  Sensation intact lateral antebrachial cutaneous nerve distribution  Good cap refill     Imaging  2 view left elbow radiographs ordered, performed and interpreted today.  Status post open distal biceps repair.  Endobutton maintained in appropriate position.  No other acute osseous abnormality appreciated    XR wrist left 3+ views  Result Date: 5/14/2025  Interpreted By:  Naun Hernandez III, STUDY: XR WRIST LEFT 3+ VIEWS; ;  5/14/2025 1:52 pm   INDICATION: Signs/Symptoms:pain.   ,S63.502A Unspecified sprain of left wrist, initial encounter   COMPARISON: None.   ACCESSION NUMBER(S): [id number]   ORDERING CLINICIAN: NAUN HERNANDEZ   FINDINGS: Three views left wrist: No acute osseous abnormality no fracture or dislocation appreciated moderate joint space narrowing about the wrist articulation as well as the CMC articulation. No lytic or blastic lesions appreciated       No acute osseous abnormality appreciated     MACRO: None   Signed by: Naun Hernandez III 5/14/2025 1:54 PM Dictation workstation:   [id number]    Assessment  Patient status post left distal biceps tendon repair 2 weeks out, new problem left wrist sprain      Plan  Surgical details discussed, x-rays reviewed  Encouraged elevation, ice.  Sutures removed.  Immobilization: TSCOPE Elbow brace 60-90, unlock 15 degrees every week until full extension obtained.  Discussed ROM/rehab goals and return to activity.  Discussed ultimate goal is to obtain full extension at 6 weeks postop  Follow-up:  ~4 weeks      Regarding left wrist pain/sprain  We will provide a neutral wrist splint to provide symptomatic relief  Pain primarily with supination and pronation hard elucidate if this is from surgical site versus injury sustained prior  I did review x-rays with him no acute osseous abnormality appreciated.  Discussed that may be related to double hit phenomenon namely surgical site as well as nerve block  We will continue to monitor  Will provide prescription for therapy

## 2025-06-04 ENCOUNTER — EVALUATION (OUTPATIENT)
Dept: PHYSICAL THERAPY | Facility: CLINIC | Age: 65
End: 2025-06-04
Payer: MEDICARE

## 2025-06-04 DIAGNOSIS — M25.522 LEFT ELBOW PAIN: Primary | ICD-10-CM

## 2025-06-04 DIAGNOSIS — M25.532 LEFT WRIST PAIN: ICD-10-CM

## 2025-06-04 DIAGNOSIS — S46.219A BICEPS TENDON TEAR: ICD-10-CM

## 2025-06-04 PROCEDURE — 97161 PT EVAL LOW COMPLEX 20 MIN: CPT | Mod: GP | Performed by: PHYSICAL THERAPIST

## 2025-06-04 PROCEDURE — 97110 THERAPEUTIC EXERCISES: CPT | Mod: GP | Performed by: PHYSICAL THERAPIST

## 2025-06-04 ASSESSMENT — PAIN SCALES - GENERAL: PAINLEVEL_OUTOF10: 3

## 2025-06-04 ASSESSMENT — PAIN - FUNCTIONAL ASSESSMENT: PAIN_FUNCTIONAL_ASSESSMENT: 0-10

## 2025-06-05 PROBLEM — M25.522 LEFT ELBOW PAIN: Status: ACTIVE | Noted: 2025-06-05

## 2025-06-05 PROBLEM — M25.532 LEFT WRIST PAIN: Status: ACTIVE | Noted: 2025-06-05

## 2025-06-05 ASSESSMENT — ENCOUNTER SYMPTOMS
LOSS OF SENSATION IN FEET: 0
DEPRESSION: 0
OCCASIONAL FEELINGS OF UNSTEADINESS: 0

## 2025-06-05 NOTE — PROGRESS NOTES
Physical Therapy Evaluation and Treatment      Patient Name: Luciano Samaniego  MRN: [id number]  Today's Date: 6/4/2025  Visit #1  Time Calculation  Start Time: 1440  Stop Time: 1530  Time Calculation (min): 50 min    Insurance:  Visit Limit: [facility]  Date Range: 9/4/2025  Co-Pay: None    Assessment:  Patient is presenting today s/p L distal biceps repair on 5/1/2025 and L wrist pain. Examination reveals no signs or symptoms of infection or DVT. Examination of L wrist shows findings of L wrist sprain. Patient will benefit from physical therapy services to improve listed impairments. Initiated treatment today to address these impairments.    Patient with the following impairments: decreased muscle performance, decreased ROM, decreased activity tolerance, pain, participation restrictions, and difficulty with ADL completion    Patient's response to session: Decreased pain, Increased ROM/joint mobility, Increase motor control, and Increased knowledge and understanding    Plan:  Treatment/Interventions: Biofeedback, Cryotherapy, Dry needling, Education/ Instruction, Hot pack, Manual therapy, Neuromuscular re-education, Self care/ home management, Taping techniques, Therapeutic activities, Therapeutic exercises  PT Plan: Skilled PT  PT Frequency: 1 time per week  Duration: 12 weeks  Onset Date: 05/01/25  Certification Period Start Date: 06/04/25  Certification Period End Date: 09/02/25  Rehab Potential: Good  Plan of Care Agreement: Patient    Current Problem:   1. Left elbow pain  Follow Up In Physical Therapy      2. Left wrist pain  Follow Up In Physical Therapy      3. Biceps tendon tear  Referral to Physical Therapy          Subjective   Patient presenting today s/p L distal biceps repair on 5/1/2025 and L wrist pain. Patient is doing well overall. L wrist is more painful than elbow. He has been wearing a hinged brace all the time except for bathing. Pain is well-controlled at this time. Patient denies numbness/tingling.  Patient wishes to restore full function of LUE.    Pain Assessment: 0-10  0-10 (Numeric) Pain Score: 3    General  Referred By: Dr. Vargas    Precautions  STEADI Fall Risk Score (The score of 4 or more indicates an increased risk of falling): 0  Precautions Comment: s/p L distal biceps repair 5/1    Objective   Shoulder PROM (L/R)  Flexion: 150°/170°  Abduction: 150°/170°  Extension: NT°/60°  External Rotation: 50°/90°  Internal rotation: 50°/70°    Elbow AROM (L/R)  Flexion: 90°/140°  Extension: -10°/0°  Supination: 30°/90°  Pronation: 70°/70°    Wrist AROM (L/R)  Flexion: 30°/70°  Extension: 20°/90°  Radial Deviation: 20°/20°  Ulnar Deviation: 30°/40°     Strength L: deferred      R: deferred    Scapular/Rib position: downward    TTP wrist flexors and extensors    Incision clean, dry, intact    Negative clunk test in wrist  Negative snuffbox tenderness    Outcome Measures:  Other Measures  Disability of Arm Shoulder Hand (DASH): 35     Treatments:  Therapeutic Exercise (18112): 24 minutes  Extensive review of HEP and precautions  Table slides, ABD*  Power  iso*  Wrist flexion/ext AROM, gravity eliminated*  PROM shoulder flex, ABD, ER    Education and discussion on HEP and treatment regarding the benefits related to current condition, POC, pathophysiology, and precautions    *added to HEP    Goals:  Patient will improve quickDASH score to meet minimal detectable change of improvement to improve performance of ADLs.    Patient will be independent with home exercise program for proper self-management of condition.    Patient will improve active range of motion in deficit areas for ADL completion.    Patient will improve strength in deficit areas so patient can work with less pain.    Patient will lift objects with left upper extremity without pain.

## 2025-06-11 ENCOUNTER — OFFICE VISIT (OUTPATIENT)
Dept: ORTHOPEDIC SURGERY | Facility: CLINIC | Age: 65
End: 2025-06-11
Payer: MEDICARE

## 2025-06-11 DIAGNOSIS — S46.212A RUPTURE OF DISTAL BICEPS TENDON, LEFT, INITIAL ENCOUNTER: ICD-10-CM

## 2025-06-11 DIAGNOSIS — Z98.890 S/P TENDON REPAIR: Primary | ICD-10-CM

## 2025-06-11 DIAGNOSIS — M16.11 PRIMARY OSTEOARTHRITIS OF RIGHT HIP: ICD-10-CM

## 2025-06-11 PROCEDURE — 1159F MED LIST DOCD IN RCRD: CPT | Performed by: STUDENT IN AN ORGANIZED HEALTH CARE EDUCATION/TRAINING PROGRAM

## 2025-06-11 PROCEDURE — 99024 POSTOP FOLLOW-UP VISIT: CPT | Performed by: STUDENT IN AN ORGANIZED HEALTH CARE EDUCATION/TRAINING PROGRAM

## 2025-06-11 PROCEDURE — 1036F TOBACCO NON-USER: CPT | Performed by: STUDENT IN AN ORGANIZED HEALTH CARE EDUCATION/TRAINING PROGRAM

## 2025-06-11 PROCEDURE — 99214 OFFICE O/P EST MOD 30 MIN: CPT | Performed by: STUDENT IN AN ORGANIZED HEALTH CARE EDUCATION/TRAINING PROGRAM

## 2025-06-11 PROCEDURE — 99212 OFFICE O/P EST SF 10 MIN: CPT | Performed by: STUDENT IN AN ORGANIZED HEALTH CARE EDUCATION/TRAINING PROGRAM

## 2025-06-11 RX ORDER — CELECOXIB 200 MG/1
200 CAPSULE ORAL DAILY
Qty: 30 CAPSULE | Refills: 0 | Status: SHIPPED | OUTPATIENT
Start: 2025-06-11 | End: 2025-07-11

## 2025-06-11 NOTE — PROGRESS NOTES
Chief Complaint   Patient presents with    Left Elbow - Follow-up     S/p distal bicep tendon repair       History of Present Illness   Patient returns today status post distal biceps tendon repair.  The patient endorses minimal post-operative pain.  Does not mention any fever or chills.    Also today presents today for evaluation of his right hip.  Did get a intra-articular cortisone injection about the hip which did provide some mild relief unfortunately has had return of pain.  Of note has a history of sciatica and low back pain     Exam  Healthy incision.  Unable to appreciate palpable cord with supination  No erythema or drainage.  Range of motion: Full flexion and extension, supination 45 degrees short of full supination  +                     EPL, FPL, MARIAMA  +                     SILT median, ulnar, radial nerve distribution.  +  lateral antebrachial cutaneous nerve distribution  Good cap refill      Right hip:  Antalgic gait  Negative Trendelenburg sign  Skin healthy and intact  No tenderness to palpation over lumbar spine  Minimal tenderness over greater trochanter     Mild pain with extreme of forward flexion   Rotation: Mild discomfort  No weakness with resisted hip flexion, abduction or adduction     Positive flexion/adduction/internal rotation  Negative flexion/abduction/external rotation test  Negative straight leg raise  Neurovascular exam normal distally    Assessment  Patient status post left distal biceps tendon repair.,  Right hip arthritis     Plan  Encouraged elevation, ice.  Discussed lifting restrictions / brace wear.  Discussed PT, ROM / strengthening goals.  Follow-up: 8weeks  Discontinue use of TSCOPE elbow brace  No lifting more than a glass of milk, discussed importance of using pain as a guide    Given findings of mild loss of supination as well as unable to appreciate palpable cord on exam I will obtain an MRI to ensure continued integrity of repair site.  Will continue with  postoperative course.  The history and clinical exam are consistent with intraarticular pathology and therefore MRI is medically indicated to evaluate the soft tissues of the joint. Follow up in one week or after completion of the MRI to advance management accordingly  The patient understands and agrees with the plan.    Regarding right hip arthritis we will provide a prescription for Celebrex right some symptomatic relief.  He does have a history of low back issues hard to truly elucidate if all this is emanating from the back or hip.  He did get significant relief with intra-articular cortisone injection does have moderate arthritis on x-rays.  Will consider an MRI for further elucidation if pain continues despite anti-inflammatory

## 2025-06-12 ENCOUNTER — DOCUMENTATION (OUTPATIENT)
Dept: ORTHOPEDIC SURGERY | Facility: CLINIC | Age: 65
End: 2025-06-12
Payer: MEDICARE

## 2025-06-15 ENCOUNTER — HOSPITAL ENCOUNTER (OUTPATIENT)
Dept: RADIOLOGY | Facility: HOSPITAL | Age: 65
Discharge: HOME | End: 2025-06-15
Payer: MEDICARE

## 2025-06-15 DIAGNOSIS — S46.212A RUPTURE OF DISTAL BICEPS TENDON, LEFT, INITIAL ENCOUNTER: ICD-10-CM

## 2025-06-15 DIAGNOSIS — Z98.890 S/P TENDON REPAIR: ICD-10-CM

## 2025-06-15 PROCEDURE — 73221 MRI JOINT UPR EXTREM W/O DYE: CPT | Mod: LT

## 2025-06-15 PROCEDURE — 73221 MRI JOINT UPR EXTREM W/O DYE: CPT | Mod: LEFT SIDE | Performed by: RADIOLOGY

## 2025-06-17 ENCOUNTER — TREATMENT (OUTPATIENT)
Dept: PHYSICAL THERAPY | Facility: CLINIC | Age: 65
End: 2025-06-17
Payer: MEDICARE

## 2025-06-17 DIAGNOSIS — M25.532 LEFT WRIST PAIN: ICD-10-CM

## 2025-06-17 DIAGNOSIS — M25.522 LEFT ELBOW PAIN: Primary | ICD-10-CM

## 2025-06-17 PROCEDURE — 97110 THERAPEUTIC EXERCISES: CPT | Mod: GP | Performed by: PHYSICAL THERAPIST

## 2025-06-17 ASSESSMENT — PAIN - FUNCTIONAL ASSESSMENT: PAIN_FUNCTIONAL_ASSESSMENT: 0-10

## 2025-06-17 ASSESSMENT — PAIN SCALES - GENERAL: PAINLEVEL_OUTOF10: 3

## 2025-06-17 NOTE — PROGRESS NOTES
Physical Therapy Treatment      Patient Name: Luciano Samaniego  MRN: [id number]  Today's Date: 6/17/2025  Visit #2  Time Calculation  Start Time: 1415  Stop Time: 1500  Time Calculation (min): 45 min    Insurance:  Visit Limit: [facility]  Date Range: 9/4/2025  Co-Pay: None    Assessment:  Today's session focused on strength, ROM, neuromuscular control, endurance, joint mobility, soft tissue mobilization, and flexibility. Patient demonstrated good tolerance to session this date. They are demonstrating good progress in skilled rehabilitation at this time, though they are still limited by decreased muscle performance, decreased ROM, decreased activity tolerance, pain, participation restrictions, and difficulty with ADL completion. Patient continues to be a good candidate for skilled PT in order to further address listed impairments. Updated HEP to reflect today's session. All questions answered.    Patient's response to session: Decreased pain, Increased ROM/joint mobility, Increase motor control, and Increased knowledge and understanding    Plan:  Continue per POC.    Current Problem:   1. Left elbow pain        2. Left wrist pain            Subjective   Patient reports he is doing well overall. Still having most of his pain in L thumb. He is 6.5 weeks s/p L distal biceps repair on 5/1/2025.    Pain Assessment: 0-10  0-10 (Numeric) Pain Score: 3    Precautions  Precautions Comment: s/p L distal biceps repair 5/1    Objective   Shoulder PROM (L/R)  Flexion: 170°/170°  Abduction: 170°/170°  Extension: 60°/60°  External Rotation: 60°/90°  Internal rotation: 60°/70°     Elbow AROM (L/R)  Flexion: 120°/140°  Extension: -4°/0°  Supination: 80°/90°  Pronation: 70°/70°     Wrist AROM (L/R)  Flexion: 60°/70°  Extension: 70°/90°  Radial Deviation: 20°/20°  Ulnar Deviation: 30°/40°      Strength L: deferred      R: deferred     Scapular/Rib position: downward     TTP wrist flexors and extensors     Incision clean, dry, intact      Negative clunk test in wrist  Negative snuffbox tenderness    Treatments:  Therapeutic Exercise (33175): 40 minutes  HEP review  UBE x 2 min  Pulleys shoulder scaption  Supination stretch at 90*  Power  isometrics*  Shoulder ER, red TB*    Manual Therapy (12911):  0 minutes  Not performed    Neuromuscular Re-education (52741):  0 minutes  Not performed    Education and discussion on HEP and treatment regarding the benefits related to current condition, POC, pathophysiology, and precautions    *added to HEP

## 2025-06-23 ENCOUNTER — TREATMENT (OUTPATIENT)
Dept: PHYSICAL THERAPY | Facility: CLINIC | Age: 65
End: 2025-06-23
Payer: MEDICARE

## 2025-06-23 DIAGNOSIS — M25.532 LEFT WRIST PAIN: ICD-10-CM

## 2025-06-23 DIAGNOSIS — M25.522 LEFT ELBOW PAIN: Primary | ICD-10-CM

## 2025-06-23 PROCEDURE — 97110 THERAPEUTIC EXERCISES: CPT | Mod: GP | Performed by: PHYSICAL THERAPIST

## 2025-06-23 ASSESSMENT — PAIN - FUNCTIONAL ASSESSMENT: PAIN_FUNCTIONAL_ASSESSMENT: 0-10

## 2025-06-23 ASSESSMENT — PAIN SCALES - GENERAL: PAINLEVEL_OUTOF10: 2

## 2025-06-23 NOTE — PROGRESS NOTES
"Physical Therapy Treatment      Patient Name: Luciano Samaniego  MRN: [id number]  Today's Date: 6/23/2025  Visit #3  Time Calculation  Start Time: 1257  Stop Time: 1320  Time Calculation (min): 23 min    Insurance:  Visit Limit: Med [facility]  Date Range: 9/4/2025  Co-Pay: None    Assessment:  Increased  strength after median nerve glide on L. Encouraged him to reach out to Dr. Vargas about this. Today's session focused on strength, ROM, neuromuscular control, endurance, joint mobility, soft tissue mobilization, and flexibility. Patient demonstrated good tolerance to session this date. They are demonstrating good progress in skilled rehabilitation at this time, though they are still limited by decreased muscle performance, decreased ROM, decreased activity tolerance, pain, participation restrictions, and difficulty with ADL completion. Patient continues to be a good candidate for skilled PT in order to further address listed impairments. Updated HEP to reflect today's session. All questions answered.    Patient's response to session: Decreased pain, Increased ROM/joint mobility, Increase motor control, and Increased knowledge and understanding    Plan:  Continue per POC.    Current Problem:   1. Left elbow pain        2. Left wrist pain            Subjective   Patient reports he was sore after last visit. He feels that his arm was \"dead\" today and he is having trouble making a full fist in LUE. He is 7.4 weeks s/p L distal biceps repair on 5/1/2025.    Pain Assessment: 0-10  0-10 (Numeric) Pain Score: 2    Precautions  Precautions Comment: s/p L distal biceps repair 5/1    Objective   Shoulder PROM (L/R)  Flexion: 170°/170°  Abduction: 170°/170°  Extension: 60°/60°  External Rotation: 60°/90°  Internal rotation: 60°/70°     Elbow AROM (L/R)  Flexion: 135°/140°  Extension: -1°/0°  Supination: 80°/90°  Pronation: 70°/70°     Wrist AROM (L/R)  Flexion: 60°/70°  Extension: 70°/90°  Radial Deviation: 20°/20°  Ulnar Deviation: " 30°/40°      Strength L: 15#     R: 92#     Scapular/Rib position: downward     TTP wrist flexors and extensors     Incision clean, dry, intact    Positive median nerve ULTT    Treatments:  Therapeutic Exercise (31011): 23 minutes  HEP review  UBE x 4 min  Median nerve glides*  Small fist *  Pronator teres isometrics  CROM    Manual Therapy (26758):  0 minutes  Not performed    Neuromuscular Re-education (00682):  0 minutes  Not performed    Education and discussion on HEP and treatment regarding the benefits related to current condition, POC, pathophysiology, and precautions    *added to HEP

## 2025-07-02 ENCOUNTER — APPOINTMENT (OUTPATIENT)
Dept: PHYSICAL THERAPY | Facility: CLINIC | Age: 65
End: 2025-07-02
Payer: MEDICARE

## 2025-07-09 ENCOUNTER — APPOINTMENT (OUTPATIENT)
Dept: PHYSICAL THERAPY | Facility: CLINIC | Age: 65
End: 2025-07-09
Payer: MEDICARE

## 2025-07-14 ENCOUNTER — APPOINTMENT (OUTPATIENT)
Dept: ORTHOPEDIC SURGERY | Facility: CLINIC | Age: 65
End: 2025-07-14
Payer: MEDICARE

## 2025-07-16 ENCOUNTER — APPOINTMENT (OUTPATIENT)
Dept: PHYSICAL THERAPY | Facility: CLINIC | Age: 65
End: 2025-07-16
Payer: MEDICARE

## 2025-07-23 ENCOUNTER — APPOINTMENT (OUTPATIENT)
Dept: PHYSICAL THERAPY | Facility: CLINIC | Age: 65
End: 2025-07-23
Payer: MEDICARE

## 2025-07-23 DIAGNOSIS — M25.532 LEFT WRIST PAIN: ICD-10-CM

## 2025-07-23 DIAGNOSIS — M25.522 LEFT ELBOW PAIN: Primary | ICD-10-CM

## 2025-08-11 ENCOUNTER — HOSPITAL ENCOUNTER (OUTPATIENT)
Dept: RADIOLOGY | Facility: CLINIC | Age: 65
Discharge: HOME | End: 2025-08-11
Payer: MEDICARE

## 2025-08-11 ENCOUNTER — OFFICE VISIT (OUTPATIENT)
Dept: ORTHOPEDIC SURGERY | Facility: CLINIC | Age: 65
End: 2025-08-11
Payer: MEDICARE

## 2025-08-11 DIAGNOSIS — M25.812 IMPINGEMENT OF LEFT SHOULDER: ICD-10-CM

## 2025-08-11 DIAGNOSIS — M25.812 IMPINGEMENT OF LEFT SHOULDER: Primary | ICD-10-CM

## 2025-08-11 DIAGNOSIS — M16.9 OSTEOARTHRITIS OF HIP, UNSPECIFIED LATERALITY, UNSPECIFIED OSTEOARTHRITIS TYPE: ICD-10-CM

## 2025-08-11 PROCEDURE — 99214 OFFICE O/P EST MOD 30 MIN: CPT | Performed by: STUDENT IN AN ORGANIZED HEALTH CARE EDUCATION/TRAINING PROGRAM

## 2025-08-11 PROCEDURE — 73030 X-RAY EXAM OF SHOULDER: CPT | Mod: LEFT SIDE | Performed by: STUDENT IN AN ORGANIZED HEALTH CARE EDUCATION/TRAINING PROGRAM

## 2025-08-11 PROCEDURE — 20610 DRAIN/INJ JOINT/BURSA W/O US: CPT | Mod: LT | Performed by: STUDENT IN AN ORGANIZED HEALTH CARE EDUCATION/TRAINING PROGRAM

## 2025-08-11 PROCEDURE — 99212 OFFICE O/P EST SF 10 MIN: CPT | Mod: 25 | Performed by: STUDENT IN AN ORGANIZED HEALTH CARE EDUCATION/TRAINING PROGRAM

## 2025-08-11 PROCEDURE — 73030 X-RAY EXAM OF SHOULDER: CPT | Mod: LT

## 2025-08-11 PROCEDURE — 2500000004 HC RX 250 GENERAL PHARMACY W/ HCPCS (ALT 636 FOR OP/ED): Performed by: STUDENT IN AN ORGANIZED HEALTH CARE EDUCATION/TRAINING PROGRAM

## 2025-08-11 RX ORDER — LIDOCAINE HYDROCHLORIDE 10 MG/ML
4 INJECTION, SOLUTION INFILTRATION; PERINEURAL
Status: COMPLETED | OUTPATIENT
Start: 2025-08-11 | End: 2025-08-11

## 2025-08-11 RX ORDER — TRIAMCINOLONE ACETONIDE 40 MG/ML
40 INJECTION, SUSPENSION INTRA-ARTICULAR; INTRAMUSCULAR
Status: COMPLETED | OUTPATIENT
Start: 2025-08-11 | End: 2025-08-11

## 2025-08-11 RX ORDER — CELECOXIB 200 MG/1
200 CAPSULE ORAL DAILY
Qty: 30 CAPSULE | Refills: 1 | Status: SHIPPED | OUTPATIENT
Start: 2025-08-11

## 2025-08-11 RX ADMIN — TRIAMCINOLONE ACETONIDE 40 MG: 40 INJECTION, SUSPENSION INTRA-ARTICULAR; INTRAMUSCULAR at 11:35

## 2025-08-11 RX ADMIN — LIDOCAINE HYDROCHLORIDE 4 ML: 10 INJECTION, SOLUTION INFILTRATION; PERINEURAL at 11:35

## (undated) DEVICE — DRESSING, ABDOMINAL PAD, CURITY, 7.5 X 8 IN

## (undated) DEVICE — DRAPE, C-ARM IMAGE

## (undated) DEVICE — TOWEL PACK, STERILE, 16X24, XRAY DETECTABLE, BLUE, 4/PK

## (undated) DEVICE — ADHESIVE, SKIN, DERMABOND ADVANCED, 15CM, PEN-STYLE

## (undated) DEVICE — APPLICATOR, CHLORAPREP, W/ORANGE TINT, 26ML

## (undated) DEVICE — CAUTERY, PENCIL, PUSH BUTTON, SMOKE EVAC, 70MM

## (undated) DEVICE — SPONGE, LAP, XRAY DECT, 18IN X 18IN, W/LOOP, STERILE

## (undated) DEVICE — BANDAGE, COFLEX, 4 X 5 YDS, TAN, STERILE, LF

## (undated) DEVICE — DRAPE, SHEET, U, W/ADHESIVE STRIP, IMPERVIOUS, 60 X 70 IN, DISPOSABLE, LF, STERILE

## (undated) DEVICE — GOWN, SURGICAL, ROYAL SILK, XL, STERILE

## (undated) DEVICE — STRAP, VELCRO, BODY, 4 X 60IN, NS

## (undated) DEVICE — SUTURE, MONOCRYL, 4-0, 27 IN, PS-2, UNDYED

## (undated) DEVICE — BATH BLANKET STERILE

## (undated) DEVICE — TUBING, SUCTION, 6MM X 10, CLEAN N-COND

## (undated) DEVICE — SYRINGE, 60 CC, IRRIGATION, BULB, CONTRO-BULB, PAPER POUCH

## (undated) DEVICE — HOSE, DUAL CPC CONNECTOR

## (undated) DEVICE — STRAP, ARM BOARD, 32 X 1.5

## (undated) DEVICE — SPLINT, SAFETY, PRE-CUT, 4 X 30 IN

## (undated) DEVICE — PADDING, CAST, SPECIALIST, 4 IN X 4 YD, STERILE

## (undated) DEVICE — GLOVE, SURGICAL, PROTEXIS PI , 7.5, PF, LF

## (undated) DEVICE — Device

## (undated) DEVICE — GLOVE, SURGICAL, PROTEXIS PI BLUE W/NEUTHERA, 8.0, PF, LF

## (undated) DEVICE — GOWN, SURGICAL, ROYAL SILK, LG, STERILE

## (undated) DEVICE — DRESSING, GAUZE, SPONGE, 12 PLY, 4 X 4 IN, PLASTIC POUCH, STRL 10PK

## (undated) DEVICE — SUTURE, VICRYL PLUS 3-0, SH, 27IN

## (undated) DEVICE — TIP, SUCTION, YANKAUER, FLEXIBLE

## (undated) DEVICE — BANDAGE, ELASTIC, 4 X 10YD, BEIGE, LF

## (undated) DEVICE — SLING, ARM, W/LEATHERETTE PAD, MEDIUM

## (undated) DEVICE — MANIFOLD, 4 PORT NEPTUNE STANDARD